# Patient Record
Sex: FEMALE | Race: WHITE | NOT HISPANIC OR LATINO | Employment: OTHER | URBAN - METROPOLITAN AREA
[De-identification: names, ages, dates, MRNs, and addresses within clinical notes are randomized per-mention and may not be internally consistent; named-entity substitution may affect disease eponyms.]

---

## 2023-03-21 PROBLEM — R10.9 ABDOMINAL PAIN: Status: ACTIVE | Noted: 2023-03-21

## 2023-03-21 PROBLEM — N89.8 VAGINAL ITCHING: Status: ACTIVE | Noted: 2023-03-21

## 2023-03-21 PROBLEM — K59.00 CONSTIPATION: Status: ACTIVE | Noted: 2023-03-21

## 2023-03-21 PROBLEM — N39.0 ACUTE LOWER UTI: Status: ACTIVE | Noted: 2023-03-21

## 2023-03-21 PROBLEM — M13.0 ARTHRITIS OF MULTIPLE SITES: Status: ACTIVE | Noted: 2023-03-21

## 2023-03-21 PROBLEM — I80.9 SUPERFICIAL PHLEBITIS: Status: ACTIVE | Noted: 2023-03-21

## 2023-03-21 PROBLEM — M79.662 PAIN IN LEFT SHIN: Status: ACTIVE | Noted: 2023-03-21

## 2023-03-21 PROBLEM — K57.90 DIVERTICULOSIS: Status: ACTIVE | Noted: 2023-03-21

## 2023-03-21 PROBLEM — C50.919 BREAST CANCER, FEMALE (MULTI): Status: ACTIVE | Noted: 2023-03-21

## 2023-03-21 PROBLEM — R41.3 MEMORY LOSS OF UNKNOWN CAUSE: Status: ACTIVE | Noted: 2023-03-21

## 2023-03-21 PROBLEM — G30.9 ALZHEIMER'S DEMENTIA (MULTI): Status: ACTIVE | Noted: 2023-03-21

## 2023-03-21 PROBLEM — I10 BENIGN ESSENTIAL HTN: Status: ACTIVE | Noted: 2023-03-21

## 2023-03-21 PROBLEM — F02.80 ALZHEIMER'S DEMENTIA (MULTI): Status: ACTIVE | Noted: 2023-03-21

## 2023-03-21 PROBLEM — I63.9 ISCHEMIC STROKE (MULTI): Status: ACTIVE | Noted: 2023-03-21

## 2023-03-21 PROBLEM — I72.9 ANEURYSM (CMS-HCC): Status: ACTIVE | Noted: 2023-03-21

## 2023-03-21 PROBLEM — E11.9 DIABETES MELLITUS, TYPE 2 (MULTI): Status: ACTIVE | Noted: 2023-03-21

## 2023-03-21 PROBLEM — K55.9 ISCHEMIC COLITIS (MULTI): Status: ACTIVE | Noted: 2023-03-21

## 2023-03-21 PROBLEM — S80.01XA CONTUSION OF KNEE, RIGHT: Status: ACTIVE | Noted: 2023-03-21

## 2023-03-21 PROBLEM — E78.5 HLD (HYPERLIPIDEMIA): Status: ACTIVE | Noted: 2023-03-21

## 2023-03-21 PROBLEM — F03.90 DEMENTIA, SENILE (MULTI): Status: ACTIVE | Noted: 2023-03-21

## 2023-03-21 PROBLEM — R53.82 CHRONIC FATIGUE: Status: ACTIVE | Noted: 2023-03-21

## 2023-03-21 RX ORDER — ACETAMINOPHEN 500 MG
TABLET ORAL
COMMUNITY
Start: 2016-06-06 | End: 2023-04-06 | Stop reason: ALTCHOICE

## 2023-03-21 RX ORDER — MELOXICAM 15 MG/1
1 TABLET ORAL DAILY
COMMUNITY
Start: 2022-03-24 | End: 2023-04-06 | Stop reason: ALTCHOICE

## 2023-03-21 RX ORDER — METFORMIN HYDROCHLORIDE 500 MG/1
TABLET ORAL 2 TIMES DAILY
COMMUNITY
End: 2023-04-06 | Stop reason: SDUPTHER

## 2023-03-21 RX ORDER — DONEPEZIL HYDROCHLORIDE 5 MG/1
TABLET, FILM COATED ORAL
COMMUNITY
End: 2023-04-06 | Stop reason: ALTCHOICE

## 2023-03-21 RX ORDER — DONEPEZIL HYDROCHLORIDE 10 MG/1
TABLET, FILM COATED ORAL
COMMUNITY
End: 2023-04-06 | Stop reason: ALTCHOICE

## 2023-03-21 RX ORDER — METFORMIN HYDROCHLORIDE 500 MG/1
TABLET, EXTENDED RELEASE ORAL
COMMUNITY
Start: 2015-08-03 | End: 2023-04-06 | Stop reason: SDUPTHER

## 2023-03-21 RX ORDER — VITAMIN E (DL,TOCOPHERYL ACET) 180 MG
CAPSULE ORAL
COMMUNITY
Start: 2016-06-06 | End: 2023-04-06 | Stop reason: ALTCHOICE

## 2023-03-21 RX ORDER — MULTIVIT-MIN/IRON/FOLIC ACID/K 18-600-40
CAPSULE ORAL
COMMUNITY
Start: 2016-06-06 | End: 2023-04-06 | Stop reason: ALTCHOICE

## 2023-04-06 ENCOUNTER — LAB (OUTPATIENT)
Dept: LAB | Facility: LAB | Age: 83
End: 2023-04-06
Payer: MEDICARE

## 2023-04-06 ENCOUNTER — OFFICE VISIT (OUTPATIENT)
Dept: PRIMARY CARE | Facility: CLINIC | Age: 83
End: 2023-04-06
Payer: MEDICARE

## 2023-04-06 ENCOUNTER — TELEPHONE (OUTPATIENT)
Dept: PRIMARY CARE | Facility: CLINIC | Age: 83
End: 2023-04-06

## 2023-04-06 VITALS
SYSTOLIC BLOOD PRESSURE: 140 MMHG | BODY MASS INDEX: 32.76 KG/M2 | OXYGEN SATURATION: 99 % | HEART RATE: 76 BPM | DIASTOLIC BLOOD PRESSURE: 62 MMHG | WEIGHT: 182 LBS

## 2023-04-06 DIAGNOSIS — Z13.29 SCREENING FOR THYROID DISORDER: ICD-10-CM

## 2023-04-06 DIAGNOSIS — Z00.00 ROUTINE GENERAL MEDICAL EXAMINATION AT A HEALTH CARE FACILITY: ICD-10-CM

## 2023-04-06 DIAGNOSIS — G25.81 RESTLESS LEG SYNDROME: ICD-10-CM

## 2023-04-06 DIAGNOSIS — Z13.0 SCREENING FOR DEFICIENCY ANEMIA: ICD-10-CM

## 2023-04-06 DIAGNOSIS — E55.9 VITAMIN D DEFICIENCY: ICD-10-CM

## 2023-04-06 DIAGNOSIS — E78.5 HYPERLIPIDEMIA, UNSPECIFIED HYPERLIPIDEMIA TYPE: ICD-10-CM

## 2023-04-06 DIAGNOSIS — I10 BENIGN ESSENTIAL HTN: ICD-10-CM

## 2023-04-06 DIAGNOSIS — Z00.00 ROUTINE GENERAL MEDICAL EXAMINATION AT A HEALTH CARE FACILITY: Primary | ICD-10-CM

## 2023-04-06 DIAGNOSIS — F02.80 ALZHEIMER'S DEMENTIA, UNSPECIFIED DEMENTIA SEVERITY, UNSPECIFIED TIMING OF DEMENTIA ONSET, UNSPECIFIED WHETHER BEHAVIORAL, PSYCHOTIC, OR MOOD DISTURBANCE OR ANXIETY (MULTI): ICD-10-CM

## 2023-04-06 DIAGNOSIS — R53.82 CHRONIC FATIGUE: ICD-10-CM

## 2023-04-06 DIAGNOSIS — F03.90 DEMENTIA, SENILE (MULTI): ICD-10-CM

## 2023-04-06 DIAGNOSIS — E11.69 TYPE 2 DIABETES MELLITUS WITH OTHER SPECIFIED COMPLICATION, UNSPECIFIED WHETHER LONG TERM INSULIN USE (MULTI): ICD-10-CM

## 2023-04-06 DIAGNOSIS — Z13.6 ENCOUNTER FOR SCREENING FOR CARDIOVASCULAR DISORDERS: ICD-10-CM

## 2023-04-06 DIAGNOSIS — G30.9 ALZHEIMER'S DEMENTIA, UNSPECIFIED DEMENTIA SEVERITY, UNSPECIFIED TIMING OF DEMENTIA ONSET, UNSPECIFIED WHETHER BEHAVIORAL, PSYCHOTIC, OR MOOD DISTURBANCE OR ANXIETY (MULTI): ICD-10-CM

## 2023-04-06 DIAGNOSIS — E11.69 TYPE 2 DIABETES MELLITUS WITH OTHER SPECIFIED COMPLICATION, UNSPECIFIED WHETHER LONG TERM INSULIN USE (MULTI): Primary | ICD-10-CM

## 2023-04-06 PROBLEM — E11.9 TYPE 2 DIABETES MELLITUS (MULTI): Status: ACTIVE | Noted: 2022-07-12

## 2023-04-06 PROBLEM — R41.3 MEMORY LOSS: Status: ACTIVE | Noted: 2022-07-12

## 2023-04-06 PROBLEM — C50.919 BREAST CANCER (MULTI): Status: ACTIVE | Noted: 2022-07-12

## 2023-04-06 LAB
APPEARANCE, URINE: ABNORMAL
BILIRUBIN, URINE: NEGATIVE
BLOOD, URINE: NEGATIVE
CHOLESTEROL (MG/DL) IN SER/PLAS: 264 MG/DL (ref 0–199)
CHOLESTEROL IN HDL (MG/DL) IN SER/PLAS: 35.9 MG/DL
CHOLESTEROL/HDL RATIO: 7.4
COLOR, URINE: YELLOW
ERYTHROCYTE DISTRIBUTION WIDTH (RATIO) BY AUTOMATED COUNT: 13.8 % (ref 11.5–14.5)
ERYTHROCYTE MEAN CORPUSCULAR HEMOGLOBIN CONCENTRATION (G/DL) BY AUTOMATED: 32.1 G/DL (ref 32–36)
ERYTHROCYTE MEAN CORPUSCULAR VOLUME (FL) BY AUTOMATED COUNT: 97 FL (ref 80–100)
ERYTHROCYTES (10*6/UL) IN BLOOD BY AUTOMATED COUNT: 4.64 X10E12/L (ref 4–5.2)
GLUCOSE, URINE: NEGATIVE MG/DL
HEMATOCRIT (%) IN BLOOD BY AUTOMATED COUNT: 44.8 % (ref 36–46)
HEMOGLOBIN (G/DL) IN BLOOD: 14.4 G/DL (ref 12–16)
KETONES, URINE: ABNORMAL MG/DL
LDL: 192 MG/DL (ref 0–99)
LEUKOCYTE ESTERASE, URINE: ABNORMAL
LEUKOCYTES (10*3/UL) IN BLOOD BY AUTOMATED COUNT: 8.5 X10E9/L (ref 4.4–11.3)
MUCUS, URINE: NORMAL /LPF
NITRITE, URINE: NEGATIVE
NRBC (PER 100 WBCS) BY AUTOMATED COUNT: 0 /100 WBC (ref 0–0)
PH, URINE: 6 (ref 5–8)
PLATELETS (10*3/UL) IN BLOOD AUTOMATED COUNT: 280 X10E9/L (ref 150–450)
PROTEIN, URINE: ABNORMAL MG/DL
RBC, URINE: 3 /HPF (ref 0–5)
SPECIFIC GRAVITY, URINE: 1.02 (ref 1–1.03)
SQUAMOUS EPITHELIAL CELLS, URINE: 6 /HPF
THYROTROPIN (MIU/L) IN SER/PLAS BY DETECTION LIMIT <= 0.05 MIU/L: 3.52 MIU/L (ref 0.44–3.98)
TRIGLYCERIDE (MG/DL) IN SER/PLAS: 180 MG/DL (ref 0–149)
UROBILINOGEN, URINE: <2 MG/DL (ref 0–1.9)
VLDL: 36 MG/DL (ref 0–40)
WBC, URINE: 1 /HPF (ref 0–5)

## 2023-04-06 PROCEDURE — 81001 URINALYSIS AUTO W/SCOPE: CPT

## 2023-04-06 PROCEDURE — 85027 COMPLETE CBC AUTOMATED: CPT

## 2023-04-06 PROCEDURE — G0439 PPPS, SUBSEQ VISIT: HCPCS | Performed by: STUDENT IN AN ORGANIZED HEALTH CARE EDUCATION/TRAINING PROGRAM

## 2023-04-06 PROCEDURE — 83036 HEMOGLOBIN GLYCOSYLATED A1C: CPT

## 2023-04-06 PROCEDURE — 82607 VITAMIN B-12: CPT

## 2023-04-06 PROCEDURE — 82306 VITAMIN D 25 HYDROXY: CPT

## 2023-04-06 PROCEDURE — 36415 COLL VENOUS BLD VENIPUNCTURE: CPT

## 2023-04-06 PROCEDURE — 84443 ASSAY THYROID STIM HORMONE: CPT

## 2023-04-06 PROCEDURE — 3078F DIAST BP <80 MM HG: CPT | Performed by: STUDENT IN AN ORGANIZED HEALTH CARE EDUCATION/TRAINING PROGRAM

## 2023-04-06 PROCEDURE — 3077F SYST BP >= 140 MM HG: CPT | Performed by: STUDENT IN AN ORGANIZED HEALTH CARE EDUCATION/TRAINING PROGRAM

## 2023-04-06 PROCEDURE — 80061 LIPID PANEL: CPT

## 2023-04-06 PROCEDURE — 1170F FXNL STATUS ASSESSED: CPT | Performed by: STUDENT IN AN ORGANIZED HEALTH CARE EDUCATION/TRAINING PROGRAM

## 2023-04-06 PROCEDURE — 1159F MED LIST DOCD IN RCRD: CPT | Performed by: STUDENT IN AN ORGANIZED HEALTH CARE EDUCATION/TRAINING PROGRAM

## 2023-04-06 RX ORDER — PREDNISOLONE ACETATE 10 MG/ML
SUSPENSION/ DROPS OPHTHALMIC
COMMUNITY
Start: 2022-07-16 | End: 2023-04-06 | Stop reason: ALTCHOICE

## 2023-04-06 RX ORDER — MELOXICAM 15 MG/1
15 TABLET ORAL
COMMUNITY
Start: 2022-04-06

## 2023-04-06 RX ORDER — BRIMONIDINE TARTRATE 2 MG/ML
SOLUTION/ DROPS OPHTHALMIC
COMMUNITY
Start: 2022-07-15 | End: 2023-04-06 | Stop reason: ALTCHOICE

## 2023-04-06 RX ORDER — ANASTROZOLE 1 MG/1
TABLET ORAL
COMMUNITY
Start: 2019-02-10 | End: 2023-04-06 | Stop reason: ALTCHOICE

## 2023-04-06 RX ORDER — LANCETS
EACH MISCELLANEOUS
Qty: 100 EACH | Refills: 3 | Status: SHIPPED | OUTPATIENT
Start: 2023-04-06

## 2023-04-06 RX ORDER — METFORMIN HYDROCHLORIDE 500 MG/1
500 TABLET ORAL
Qty: 180 TABLET | Refills: 3 | Status: SHIPPED | OUTPATIENT
Start: 2023-04-06 | End: 2024-04-22

## 2023-04-06 RX ORDER — PREDNISOLONE ACETATE 10 MG/ML
1 SUSPENSION/ DROPS OPHTHALMIC 3 TIMES DAILY
COMMUNITY
Start: 2022-07-15 | End: 2023-04-06 | Stop reason: ALTCHOICE

## 2023-04-06 RX ORDER — KETOROLAC TROMETHAMINE 5 MG/ML
1 SOLUTION OPHTHALMIC 4 TIMES DAILY
COMMUNITY
Start: 2022-04-18 | End: 2023-04-06 | Stop reason: ALTCHOICE

## 2023-04-06 RX ORDER — BRIMONIDINE TARTRATE 2 MG/ML
1 SOLUTION/ DROPS OPHTHALMIC 2 TIMES DAILY
COMMUNITY
Start: 2022-07-15 | End: 2023-04-06 | Stop reason: ALTCHOICE

## 2023-04-06 RX ORDER — BLOOD SUGAR DIAGNOSTIC
STRIP MISCELLANEOUS
Qty: 100 STRIP | Refills: 3 | Status: SHIPPED | OUTPATIENT
Start: 2023-04-06

## 2023-04-06 RX ORDER — METFORMIN HYDROCHLORIDE 500 MG/1
500 TABLET ORAL 2 TIMES DAILY
COMMUNITY
End: 2023-04-06 | Stop reason: SDUPTHER

## 2023-04-06 RX ORDER — METFORMIN HYDROCHLORIDE 500 MG/1
500 TABLET, EXTENDED RELEASE ORAL 2 TIMES DAILY
COMMUNITY
Start: 2018-12-15 | End: 2023-04-06 | Stop reason: ALTCHOICE

## 2023-04-06 RX ORDER — KETOROLAC TROMETHAMINE 5 MG/ML
SOLUTION OPHTHALMIC
COMMUNITY
Start: 2022-07-21 | End: 2023-04-06 | Stop reason: ALTCHOICE

## 2023-04-06 RX ORDER — PRAMIPEXOLE DIHYDROCHLORIDE 0.5 MG/1
0.5 TABLET ORAL NIGHTLY PRN
Qty: 30 TABLET | Refills: 11 | Status: SHIPPED | OUTPATIENT
Start: 2023-04-06 | End: 2024-04-05

## 2023-04-06 ASSESSMENT — ACTIVITIES OF DAILY LIVING (ADL)
DRESSING: INDEPENDENT
DOING_HOUSEWORK: TOTAL CARE
BATHING: INDEPENDENT
GROCERY_SHOPPING: TOTAL CARE
MANAGING_FINANCES: TOTAL CARE
TAKING_MEDICATION: TOTAL CARE

## 2023-04-06 ASSESSMENT — ENCOUNTER SYMPTOMS
LOSS OF SENSATION IN FEET: 0
OCCASIONAL FEELINGS OF UNSTEADINESS: 1
DEPRESSION: 0

## 2023-04-06 ASSESSMENT — PATIENT HEALTH QUESTIONNAIRE - PHQ9
1. LITTLE INTEREST OR PLEASURE IN DOING THINGS: NOT AT ALL
SUM OF ALL RESPONSES TO PHQ9 QUESTIONS 1 AND 2: 0
2. FEELING DOWN, DEPRESSED OR HOPELESS: NOT AT ALL

## 2023-04-06 NOTE — PROGRESS NOTES
Subjective   Patient ID: Hailey Rosas is a 82 y.o. female who presents for Medicare Annual Wellness Visit Subsequent (She is not fasting.) and Med Refill.    HPI Medicare wellness visit.    Review of Systems  Constitutional: NO F, chills, or sweats  Eyes: no blurred vision or visual disturbance  ENT: no hearing loss, no congestion, no nasal discharge, no hoarseness and no sore throat.   Cardiovascular: no chest pain, no edema, no palps and no syncope.   Respiratory: no cough,no s.o.b. and no wheezing  Gastrointestinal: no abdominal pain, No C/D no N/V, no blood in stools  Genitourinary: no dysuria, no change in urinary frequency, no urinary hesitancy and no feelings of urinary urgency.   Musculoskeletal: Multiple joint pains  Integumentary: no new skin lesions and no rashes.   Neurological: no difficulty walking, no headache, progressive memory issues, seems to have restless legs at night  Psychiatric: no anxiety, no depression, no anhedonia and no substance use disorders.   Endocrine: no recent weight gain and no recent weight loss.   Hematologic/Lymphatic: no tendency for easy bruising and no swollen glands.  Objective   /62 (BP Location: Left arm, Patient Position: Sitting, BP Cuff Size: Adult)   Pulse 76   Wt 82.6 kg (182 lb)   SpO2 99%   BMI 32.76 kg/m²     Physical Exam  gen- a & o x 1, pleasantly demented  heent- eomi, perrla, ear canals patent, TM's non-erythematous, no fluid, frontal and maxillary sinus's nontender  neck- supple, nontender, no palpable or enlarged nodes, no thyromegaly  heart- rrr, no murmurs  lungs- cta b/l , no w/r/r  chest- symmetric, nontender  ab- soft, nontender, no palpable organomegaly, postive bowel sounds  ex's- no c/c/e  neuro- CNs 2-12 grossly intact, full sensation and strength in all extremities    Assessment/Plan     1. Medicare wellness visit.  No concerns on exam.  Screening labs ordered today.  No longer needs mammograms or Pap smears or  colonoscopies.  Vaccinations are up-to-date.     2. Chronic Alzheimer's dementia.  Do your best with regular exercise.  Try to participate in social engagement.  Reading, puzzles games etc.  We do advise on following up with neurology.     3. Generalized osteoarthritis. Bilateral hands. Occasional hips, ankles and knees.

## 2023-04-06 NOTE — TELEPHONE ENCOUNTER
[facility] left a VM stating you sent the prescription for test strips and lancets but did not send a prescription for the machine.

## 2023-04-06 NOTE — PATIENT INSTRUCTIONS
1. Medicare wellness visit.  No concerns on exam.  Screening labs ordered today.  No longer needs mammograms or Pap smears or colonoscopies.  Vaccinations are up-to-date.     2. Chronic Alzheimer's dementia.  Do your best with regular exercise.  Try to participate in social engagement.  Reading, puzzles games etc.  We do advise on following up with neurology.     3. Generalized osteoarthritis. Bilateral hands. Occasional hips, ankles and knees.

## 2023-04-07 LAB
CALCIDIOL (25 OH VITAMIN D3) (NG/ML) IN SER/PLAS: 28 NG/ML
COBALAMIN (VITAMIN B12) (PG/ML) IN SER/PLAS: 321 PG/ML (ref 211–911)
ESTIMATED AVERAGE GLUCOSE FOR HBA1C: 163 MG/DL
HEMOGLOBIN A1C/HEMOGLOBIN TOTAL IN BLOOD: 7.3 %

## 2023-04-08 RX ORDER — DEXTROSE 4 G
TABLET,CHEWABLE ORAL
Qty: 1 EACH | Refills: 0 | Status: SHIPPED | OUTPATIENT
Start: 2023-04-08

## 2023-05-02 ENCOUNTER — TELEPHONE (OUTPATIENT)
Dept: PRIMARY CARE | Facility: CLINIC | Age: 83
End: 2023-05-02
Payer: MEDICARE

## 2023-10-06 ENCOUNTER — OFFICE VISIT (OUTPATIENT)
Dept: PRIMARY CARE | Facility: CLINIC | Age: 83
End: 2023-10-06
Payer: MEDICARE

## 2023-10-06 VITALS
WEIGHT: 172 LBS | DIASTOLIC BLOOD PRESSURE: 62 MMHG | HEART RATE: 70 BPM | SYSTOLIC BLOOD PRESSURE: 110 MMHG | OXYGEN SATURATION: 98 % | BODY MASS INDEX: 30.96 KG/M2

## 2023-10-06 DIAGNOSIS — E11.69 TYPE 2 DIABETES MELLITUS WITH OTHER SPECIFIED COMPLICATION, UNSPECIFIED WHETHER LONG TERM INSULIN USE (MULTI): Primary | ICD-10-CM

## 2023-10-06 LAB — HBA1C MFR BLD: 6.4 % (ref 4.2–6.5)

## 2023-10-06 PROCEDURE — 3078F DIAST BP <80 MM HG: CPT | Performed by: STUDENT IN AN ORGANIZED HEALTH CARE EDUCATION/TRAINING PROGRAM

## 2023-10-06 PROCEDURE — 83036 HEMOGLOBIN GLYCOSYLATED A1C: CPT | Mod: CLIA WAIVED TEST | Performed by: STUDENT IN AN ORGANIZED HEALTH CARE EDUCATION/TRAINING PROGRAM

## 2023-10-06 PROCEDURE — 99213 OFFICE O/P EST LOW 20 MIN: CPT | Performed by: STUDENT IN AN ORGANIZED HEALTH CARE EDUCATION/TRAINING PROGRAM

## 2023-10-06 PROCEDURE — 1125F AMNT PAIN NOTED PAIN PRSNT: CPT | Performed by: STUDENT IN AN ORGANIZED HEALTH CARE EDUCATION/TRAINING PROGRAM

## 2023-10-06 PROCEDURE — 1036F TOBACCO NON-USER: CPT | Performed by: STUDENT IN AN ORGANIZED HEALTH CARE EDUCATION/TRAINING PROGRAM

## 2023-10-06 PROCEDURE — 1159F MED LIST DOCD IN RCRD: CPT | Performed by: STUDENT IN AN ORGANIZED HEALTH CARE EDUCATION/TRAINING PROGRAM

## 2023-10-06 PROCEDURE — 3074F SYST BP LT 130 MM HG: CPT | Performed by: STUDENT IN AN ORGANIZED HEALTH CARE EDUCATION/TRAINING PROGRAM

## 2023-10-06 RX ORDER — ACETAMINOPHEN 500 MG
50 TABLET ORAL DAILY
COMMUNITY
Start: 2016-06-06

## 2023-10-06 RX ORDER — ANASTROZOLE 1 MG/1
1 TABLET ORAL DAILY
COMMUNITY

## 2023-10-06 RX ORDER — VITAMIN E (DL,TOCOPHERYL ACET) 180 MG
1 CAPSULE ORAL DAILY
COMMUNITY
Start: 2016-06-06

## 2023-10-06 RX ORDER — MULTIVIT-MIN/IRON/FOLIC ACID/K 18-600-40
1 CAPSULE ORAL DAILY
COMMUNITY
Start: 2016-06-06

## 2023-10-06 RX ORDER — DONEPEZIL HYDROCHLORIDE 5 MG/1
1 TABLET, FILM COATED ORAL NIGHTLY
COMMUNITY
Start: 2021-08-20

## 2023-10-06 RX ORDER — ASPIRIN 81 MG/1
81 TABLET ORAL DAILY
COMMUNITY

## 2023-10-06 ASSESSMENT — ENCOUNTER SYMPTOMS: DEPRESSION: 1

## 2023-10-06 ASSESSMENT — PAIN SCALES - GENERAL: PAINLEVEL: 7

## 2023-10-06 NOTE — PATIENT INSTRUCTIONS
1.  Diabetes follow-up.  Hemoglobin A1c is excellent at 6.4.  No change to meds.  Diabetes can be progressive (requiring more medications with time). If we can't regulate your Hemoglobin A1C with current meds we will add more in the future and consider Insulin therapy. Persistently elevated blood sugars can damage small blood vessels and nerves in the brain, eyes, heart, kidneys and feet. You should be getting yearly dilated eye exams. You should be inspecting your feet daily for any injuries. Also do not walk bare foot indoors or outside. Weight loss (to a BMI 30) and regular exercise (30 minutes per day) can greatly improve your need for medications for your diabetes. A hemoglobin A1c (3 month blood sugar average) will be checked 1-4 times per year to help monitor you blood sugar control.We are aggressive at treating diabetes because it is a significant risk factor for heart disease and stroke. Please reduce the amount of carbohydrates you are eating, and instead try eating more lean protein such as fish, turkey, and chicken. Please try to follow an ADA (American Diabetes Association) diet.    Follow-up in 6 months for Medicare wellness visit sooner for any issues or concerns.  Call for any refills needed.

## 2023-10-06 NOTE — PROGRESS NOTES
Subjective   Patient ID: Hailey Rosas is a 83 y.o. female who presents for Diabetes (6 month follow up.) and Memory Loss.    [facility] [place] comes in for 6-month diabetes check    Review of Systems  Constitutional: NO F, chills, or sweats  Eyes: no blurred vision or visual disturbance  ENT: no hearing loss, no congestion, no nasal discharge, no hoarseness and no sore throat.   Cardiovascular: no chest pain, no edema, no palps and no syncope.   Respiratory: no cough,no s.o.b. and no wheezing  Gastrointestinal: no abdominal pain, No C/D no N/V, no blood in stools  Genitourinary: no dysuria, no change in urinary frequency, no urinary hesitancy and no feelings of urinary urgency.   Musculoskeletal: no arthralgias,  no back pain and no myalgias.   Integumentary: no new skin lesions and no rashes.   Neurological: no difficulty walking, no headache, no limb weakness, no numbness and no tingling.,  Significant dementia  Psychiatric: no anxiety, no depression, no anhedonia and no substance use disorders.   Endocrine: Stable readings at home  Objective   /62 (BP Location: Left arm, Patient Position: Sitting, BP Cuff Size: Adult)   Pulse 70   Wt 78 kg (172 lb)   SpO2 98%   BMI 30.96 kg/m²     Physical Exam  gen- a & o x 3, nad, pleasant  heent- eomi, perrla, ear canals patent, TM's non-erythematous, no fluid, frontal and maxillary sinus's nontender  neck- supple, nontender, no palpable or enlarged nodes, no thyromegaly  heart- rrr, no murmurs  lungs- cta b/l , no w/r/r    Assessment/Plan     1.  Diabetes follow-up.  Hemoglobin A1c is excellent at 6.4.  No change to meds.  Diabetes can be progressive (requiring more medications with time). If we can't regulate your Hemoglobin A1C with current meds we will add more in the future and consider Insulin therapy. Persistently elevated blood sugars can damage small blood vessels and nerves in the brain, eyes, heart, kidneys and feet. You should be getting yearly dilated eye exams. You  should be inspecting your feet daily for any injuries. Also do not walk bare foot indoors or outside. Weight loss (to a BMI 30) and regular exercise (30 minutes per day) can greatly improve your need for medications for your diabetes. A hemoglobin A1c (3 month blood sugar average) will be checked 1-4 times per year to help monitor you blood sugar control.We are aggressive at treating diabetes because it is a significant risk factor for heart disease and stroke. Please reduce the amount of carbohydrates you are eating, and instead try eating more lean protein such as fish, turkey, and chicken. Please try to follow an ADA (American Diabetes Association) diet.    Follow-up in 6 months for Medicare wellness visit sooner for any issues or concerns.  Call for any refills needed.

## 2023-12-26 ENCOUNTER — TELEPHONE (OUTPATIENT)
Dept: PRIMARY CARE | Facility: CLINIC | Age: 83
End: 2023-12-26
Payer: MEDICARE

## 2023-12-26 NOTE — TELEPHONE ENCOUNTER
Patient received a Covid Vaccine (Moderna) back in march of this year , and wants to know if they can get another one this week. Pharmacist advised against it   [phone]

## 2024-02-26 ENCOUNTER — TELEPHONE (OUTPATIENT)
Dept: PRIMARY CARE | Facility: CLINIC | Age: 84
End: 2024-02-26
Payer: MEDICARE

## 2024-02-26 NOTE — TELEPHONE ENCOUNTER
Pt  called and states that the pt needs a referral for a stroke doctor. Pt  would like to know if you could recommend and place that order.

## 2024-03-26 ENCOUNTER — APPOINTMENT (OUTPATIENT)
Dept: NEUROLOGY | Facility: HOSPITAL | Age: 84
End: 2024-03-26
Payer: MEDICARE

## 2024-04-09 PROBLEM — B96.89 ACUTE BACTERIAL SINUSITIS: Status: ACTIVE | Noted: 2024-04-09

## 2024-04-09 PROBLEM — Z85.3 HISTORY OF MALIGNANT NEOPLASM OF BREAST: Status: ACTIVE | Noted: 2024-04-09

## 2024-04-09 PROBLEM — Z86.39 HISTORY OF TYPE 1 DIABETES MELLITUS: Status: ACTIVE | Noted: 2024-04-09

## 2024-04-09 PROBLEM — J01.90 ACUTE BACTERIAL SINUSITIS: Status: ACTIVE | Noted: 2024-04-09

## 2024-04-09 PROBLEM — M79.605 PAIN OF LEFT LOWER EXTREMITY: Status: ACTIVE | Noted: 2023-03-21

## 2024-04-09 PROBLEM — G25.81 RESTLESS LEGS SYNDROME: Status: ACTIVE | Noted: 2024-04-09

## 2024-04-12 ENCOUNTER — APPOINTMENT (OUTPATIENT)
Dept: NEUROLOGY | Facility: HOSPITAL | Age: 84
End: 2024-04-12
Payer: MEDICARE

## 2024-04-21 DIAGNOSIS — E11.69 TYPE 2 DIABETES MELLITUS WITH OTHER SPECIFIED COMPLICATION, UNSPECIFIED WHETHER LONG TERM INSULIN USE (MULTI): ICD-10-CM

## 2024-04-22 RX ORDER — METFORMIN HYDROCHLORIDE 500 MG/1
500 TABLET ORAL
Qty: 180 TABLET | Refills: 3 | Status: SHIPPED | OUTPATIENT
Start: 2024-04-22 | End: 2024-04-23 | Stop reason: SDUPTHER

## 2024-04-23 ENCOUNTER — TELEPHONE (OUTPATIENT)
Dept: PRIMARY CARE | Facility: CLINIC | Age: 84
End: 2024-04-23

## 2024-04-23 DIAGNOSIS — E11.69 TYPE 2 DIABETES MELLITUS WITH OTHER SPECIFIED COMPLICATION, UNSPECIFIED WHETHER LONG TERM INSULIN USE (MULTI): ICD-10-CM

## 2024-04-23 RX ORDER — METFORMIN HYDROCHLORIDE 500 MG/1
500 TABLET ORAL
Qty: 180 TABLET | Refills: 3 | Status: SHIPPED | OUTPATIENT
Start: 2024-04-23

## 2024-08-30 ENCOUNTER — OFFICE VISIT (OUTPATIENT)
Dept: NEUROLOGY | Facility: HOSPITAL | Age: 84
End: 2024-08-30
Payer: MEDICARE

## 2024-08-30 VITALS
WEIGHT: 159.83 LBS | HEIGHT: 62 IN | TEMPERATURE: 98 F | SYSTOLIC BLOOD PRESSURE: 150 MMHG | DIASTOLIC BLOOD PRESSURE: 71 MMHG | HEART RATE: 75 BPM | BODY MASS INDEX: 29.41 KG/M2 | RESPIRATION RATE: 16 BRPM

## 2024-08-30 DIAGNOSIS — G30.9 ALZHEIMER'S DEMENTIA, UNSPECIFIED DEMENTIA SEVERITY, UNSPECIFIED TIMING OF DEMENTIA ONSET, UNSPECIFIED WHETHER BEHAVIORAL, PSYCHOTIC, OR MOOD DISTURBANCE OR ANXIETY (MULTI): Primary | ICD-10-CM

## 2024-08-30 DIAGNOSIS — F02.80 ALZHEIMER'S DEMENTIA, UNSPECIFIED DEMENTIA SEVERITY, UNSPECIFIED TIMING OF DEMENTIA ONSET, UNSPECIFIED WHETHER BEHAVIORAL, PSYCHOTIC, OR MOOD DISTURBANCE OR ANXIETY (MULTI): Primary | ICD-10-CM

## 2024-08-30 PROCEDURE — 3078F DIAST BP <80 MM HG: CPT | Performed by: PSYCHIATRY & NEUROLOGY

## 2024-08-30 PROCEDURE — 3077F SYST BP >= 140 MM HG: CPT | Performed by: PSYCHIATRY & NEUROLOGY

## 2024-08-30 PROCEDURE — 1126F AMNT PAIN NOTED NONE PRSNT: CPT | Performed by: PSYCHIATRY & NEUROLOGY

## 2024-08-30 PROCEDURE — 99214 OFFICE O/P EST MOD 30 MIN: CPT | Performed by: PSYCHIATRY & NEUROLOGY

## 2024-08-30 PROCEDURE — 1159F MED LIST DOCD IN RCRD: CPT | Performed by: PSYCHIATRY & NEUROLOGY

## 2024-08-30 RX ORDER — DONEPEZIL HYDROCHLORIDE 5 MG/1
5 TABLET, FILM COATED ORAL NIGHTLY
Qty: 90 TABLET | Refills: 3 | Status: SHIPPED | OUTPATIENT
Start: 2024-08-30 | End: 2025-08-30

## 2024-08-30 ASSESSMENT — PAIN SCALES - GENERAL: PAINLEVEL: 0-NO PAIN

## 2024-08-30 ASSESSMENT — MINI MENTAL STATE EXAM
WHAT STATE, COUNTRY, CITY, HOSPITAL, FLOOR: 4 CORRECT
WHAT IS THE YEAR, SEASON, DATE, DAY, AND MONTH: 0 CORRECT

## 2024-08-30 NOTE — PROGRESS NOTES
"Subjective     Hailey Rosas is a right handed  84 y.o. year old female who presents for follow up of moderate dementia. Patient is accompanied by: child daughter  Visit type: follow up visit, Last seen  09/26/2023    Interval Hx   Daughter has been staying with patient and  since  broke hip (2 months now). She is planning to be there until at least second week of October. Son lives close by and can come help sometimes.    Appetite is good as long as it is something she is craving like doughnuts. Dentures are damaged so limiting some of the eating but planning to fix soon. Report no new issue with bowel or bladder. Mood is good except with some instances of frustration/confusion when she cannot remember certain things. Daughter was not aware that patient needed to be on Aricept so patient has not been getting it. Hallucinations have not happened in a while. Per daughter, the day she had hallucinations, she had had some alcohol that (at least 2 drinks of bourbon). No hallucination without the alcohol. Sleep is not great because she is often up in the middle of the night. Denies any fall but states that if it happened in the night then she might not remember. However denies feeling any unexplained aches/pains.    Prior Hx:  HPI:  Ms. Rosas is a 83 -year-old RH woman with 14 years of formal education and PMHx of who is presenting today for follow up of moderate dementia, seen 2 months prior and started on Donepezil 5mg daily at last visit. Patient is oriented to person, but not time at all oriented to doctors office not building does not know why she's here.     Mood is \"good.\". has not noticed any significant depressive symptoms. Gets frustrated with memory problems.   No excessive worry or anxiety.   Has good appetite - eats healthy.      No change in personality, social disinhibition, change in dietary preferences, loss of empathy, stereotyped or repetitive behaviors.      No visual hallucinations, " fluctuating cognition, tremors, REM sleep behavior disorder, falls. Does not comprehend what she sees- doesn't know what to do when meals are presented to her. Does not know how to use utensils.      Neuropsychiatric symptoms:   Depression - denies depression. Appetite ok. Sleeping fine. No worthlessness/helplessness. No suicidal thoughts, intent or plans.   Anxiety - Denies.   Psychosis - Denies.   Jessica - Denies.   Suicidality - Denies.      Previously referred for cognitive evaluation. She is accompanied by her  Emmanuel, he has healthcare POA. She was seen by Dr. Jiménez and Dr. Graves in the past, Dr. Jiménez diagnosed her with dementia ans started her on donepezil, Dr. Graves raised it to 10 mg and the patient ran out of it, she doesn't take it for over a year, she tolerated the medication well.   Emmanuel has been noticing cognitive changes for over 3 years. Onset was gradual. has trouble with spelling, names of things, forgets scheduled events but uses a calendar. Forgets conversations. progressive loss of memory for over the last few years with loss of activities of daily living including inability to cook, drive and more recently some difficulty with dressing/bathing.      MRI brain wo contrast on 9/27/2021 shows no diffusion restriction to suggest acute infarct. Generalized parenchymal volume loss, similar to previous, chronic white matter changes likely due to chronic small vessel ischemic disease  Areas of encephalomalacia in the right frontal parietal lobes likely represent chronic infarcts. No evidence of intracranial hemorrhage. There is no mass effect or midline shift.     Past Neuropsychiatric History:  Handedness: right.   History of traumatic brain injury: None.   History of seizures: None  History of stroke: None.   Past psychiatric history: None     PMH/PSH:  As above, in addition     Meds: reviewed as listed     Allergies: reviewed as listed        Family history:   Psychiatric: None.  "  Dementia: None   Parkinsons disease: None  Huntingtons disease: None  ALS: None     Social History:   No Tobacco use, 4-5 glasses alcohol weekly, no recreational drugs  . 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  Current Outpatient Medications   Medication Instructions    anastrozole (ARIMIDEX) 1 mg, oral, Daily    ascorbic acid, vitamin C, 500 mg capsule 1 capsule, oral, Daily    ascorbic acid-vitamin E-biotin (Hair, Skin, Nails with Biotin) 7.5-7.5-1,250 mg-unit-mcg tablet,chewable 1 tablet, oral, Daily    aspirin 81 mg, oral, Daily    blood sugar diagnostic (GenStrip Test Strip) strip Test once daily as directed    blood-glucose meter misc Test glucose as directed    cholecalciferol (VITAMIN D3) 50 mcg, oral, Daily    donepezil (ARICEPT) 5 mg, oral, Nightly    lancets misc Test once daily as directed    MECOBALAMIN, VITAMIN B12, ORAL oral    meloxicam (MOBIC) 15 mg, oral, Daily RT    metFORMIN (GLUCOPHAGE) 500 mg, oral, 2 times daily (morning and late afternoon)    pramipexole (MIRAPEX) 0.5 mg, oral, Nightly PRN    ubidecarenone (COENZYME Q10, BULK, MISC) 1 capsule, oral, Daily      Vitals:    08/30/24 1607   BP: 150/71   BP Location: Left arm   Patient Position: Sitting   BP Cuff Size: Adult   Pulse: 75   Resp: 16   Temp: 36.7 °C (98 °F)   Weight: 72.5 kg (159 lb 13.3 oz)   Height: 1.575 m (5' 2\")     Objective   Neurological Exam  Mental Status  Awake and alert. Level of consciousness: Not oriented to season, date, or any current events in society. Aware she took elevator for her appointment, aware she is in a medical building and that she is in [place]..    Motor    Mild rest tremor in LUE (tremor also present in posture)  Moderate winter in finger tapping L>R  No persistent clapping  Has paratonia, no clear increased tone..    Gait    Ambulates with cane  Gait is cautious " with mild left shoulder elevation and reduced left arm swing.      TR HGBA1C (Data Conversion)   Date Value Ref Range Status   05/24/2018 7.7 (H) 4.2 - 6.5 % Final     POCT Hemoglobin A1C   Date Value Ref Range Status   10/06/2023 6.4 4.2 - 6.5 % Final     Estimated Average Glucose   Date Value Ref Range Status   04/06/2023 163 MG/DL Final     TSH   Date Value Ref Range Status   04/06/2023 3.52 0.44 - 3.98 mIU/L Final     Comment:      TSH testing is performed using different testing    methodology at [facility] than at other    [facility]. Direct result comparisons should    only be made within the same method.     Folate   Date Value Ref Range Status   09/27/2021 16.3 >5.0 ng/mL Final     Comment:     Low           <3.4  Borderline 3.4-5.0  Normal        >5.0  .   Biotin interference may cause falsely elevated results.    Patients taking a Biotin dose of up to 5 mg/day should    refrain from taking Biotin for 24 hours before sample    collection. Providers may contact their local laboratory   for further information.         Assessment/Plan     Hailey Rosas is a 84 y.o. RH female who presents with daughter for follow up of moderate dementia. Last seen  09/26/2023. Patient's  has been immobile for past 2 months since fracturing his hip. Daughter has been living with them to help out. Patient has not been getting Aricept as daughter was not aware that she was supposed to be on it. Daughter reports cognition has declined since last visit. Denies VH, any changes in bowel or bladder or falls. Sleep is not great as she wakes up frequently in the middle of the night but appetite and mood are good. Family is working and coordinating continued supervision.    The following was discussed:  - [place] at next visit as patient did not have reading glasses today.  - Please take the medication Aricept (donepezil) every day. This is the medication that can slow the progression of your dementia. Refill  was ordered. In the future we can consider adding a second medication called Memantine   - We recommend you increase your social interactions. One way to do this effectively is to join a neighborhood community center so as to meet and interact with more people. We also encourage other mind stimulating activities such as reading, doing puzzles or sodoku.   - Please bring your glasses next time so we can have you complete he memory test.  - Please make sure you address the hearing issues as it can play a significant role in how your brain receives information   - Please call and schedule a follow up appointment in 6 months     General brain health guidelines:  - make sure your other medical conditions are well controlled (e.g., high blood pressure, high cholesterol, diabetes, sleep apnea etc)  - do not smoke or chew tobacco  - address any sensory deficits (e.g., proper glasses for poor eyesight, hearing aids for hearing loss)  - use a weekly pill box  - eat a heart healthy diet (e.g., lots of fruits and vegetables, low fat and cholesterol)  - exercise at least four days per week, 30 minutes at a time at an intensity that would make it difficult to converse with someone   - make sure you are getting at least 7 hours of quality sleep per night  - keep yourself mentally active daily by reading, playing cards, doing word searches, puzzles, etc.  - stay socially active by being part of a group or organization     Here are more resources available for you :    a. Consultation with a  who specializes in cognitive decline in older adulthood. They can assist with counseling, educational resources, patient support groups, caregiver support groups, and preparation for future changes. Beth Wachter or Basia Pappas at [facility] ([address] [place] [address] [address] in [place]; [phone]) could help with a referral, or they can contact the Alzheimer's Association 24-hour Helpline (1-548.139.4114).   b.  Keely Spivey at [facility] runs a caregiver training program that uses empirically based techniques to prepare and equip caregivers for the demands of that role when caring for someone with dementia.    c. Helpful resources for addressing the day-to-day challenges of cognitive dysfunction are offered at the Alzheimer's Association (now the Alzheimer's Disease and Related Disorders Association) website (www.alz.org) or by calling their 24-hour Helpline (1-775.312.4825).    d. The Family Caregiver [employer] website also has helpful information: http://www.caregiver.org/caregiver/jsp/home.jsp

## 2025-04-10 ENCOUNTER — APPOINTMENT (OUTPATIENT)
Dept: PRIMARY CARE | Facility: CLINIC | Age: 85
End: 2025-04-10
Payer: MEDICARE

## 2025-04-10 VITALS
WEIGHT: 157 LBS | SYSTOLIC BLOOD PRESSURE: 118 MMHG | HEIGHT: 62 IN | BODY MASS INDEX: 28.89 KG/M2 | DIASTOLIC BLOOD PRESSURE: 58 MMHG

## 2025-04-10 DIAGNOSIS — E11.69 TYPE 2 DIABETES MELLITUS WITH OTHER SPECIFIED COMPLICATION, UNSPECIFIED WHETHER LONG TERM INSULIN USE (MULTI): Primary | ICD-10-CM

## 2025-04-10 DIAGNOSIS — H61.23 BILATERAL IMPACTED CERUMEN: ICD-10-CM

## 2025-04-10 DIAGNOSIS — G25.81 RESTLESS LEG SYNDROME: ICD-10-CM

## 2025-04-10 DIAGNOSIS — F03.90 DEMENTIA, UNSPECIFIED DEMENTIA SEVERITY, UNSPECIFIED DEMENTIA TYPE, UNSPECIFIED WHETHER BEHAVIORAL, PSYCHOTIC, OR MOOD DISTURBANCE OR ANXIETY (MULTI): ICD-10-CM

## 2025-04-10 PROCEDURE — 99204 OFFICE O/P NEW MOD 45 MIN: CPT | Performed by: STUDENT IN AN ORGANIZED HEALTH CARE EDUCATION/TRAINING PROGRAM

## 2025-04-10 PROCEDURE — 3078F DIAST BP <80 MM HG: CPT | Performed by: STUDENT IN AN ORGANIZED HEALTH CARE EDUCATION/TRAINING PROGRAM

## 2025-04-10 PROCEDURE — G2211 COMPLEX E/M VISIT ADD ON: HCPCS | Performed by: STUDENT IN AN ORGANIZED HEALTH CARE EDUCATION/TRAINING PROGRAM

## 2025-04-10 PROCEDURE — 1036F TOBACCO NON-USER: CPT | Performed by: STUDENT IN AN ORGANIZED HEALTH CARE EDUCATION/TRAINING PROGRAM

## 2025-04-10 PROCEDURE — 3074F SYST BP LT 130 MM HG: CPT | Performed by: STUDENT IN AN ORGANIZED HEALTH CARE EDUCATION/TRAINING PROGRAM

## 2025-04-10 NOTE — PROGRESS NOTES
"Subjective   Patient ID: Hailey Rosas is a 84 y.o. female who presents for [facility] (Bilateral ear issues x 4 months).    HPI   Initial PCP visit.    Pt is a 84yoF w/ a hx T2DM and alzheimer's dementia presenting today to [facility].     Here with daughter Cathy.    Does walks almost daily, does crosswords/look at photos  Listens to music and certain tv shows    Mood is pretty good. Has a couple hours of lucidity daily usually and waxes/wanes overall.    Has issues with ear fullness and feels like she can't hear. Has rhinorrhea daily and will take sudafed. Denies any vision loss, tinnitus, or sinus pressure.    Meds:   Metformin 500mg BID  ASA 81  donepezil    Allergies   Allergen Reactions    Hay Fever And Allergy Relief Unknown     hayfever    House Dust Unknown    Melon Unknown    Statins-Hmg-Coa Reductase Inhibitors Other and Unknown    Penicillins Other and Rash     weakness    Sulfa (Sulfonamide Antibiotics) Other, Rash and Unknown     weakness       SocHx:   was primary caregiver but now daughter lives with her and helps take care of her.  broke hip last summer and since then son/daughter rotate helping her. She is reliant for iADLs/ADLs. Ambulates with cane.         Review of Systems  12 point ROS reviewed and otherwise negative except as stated above.    Objective   /58   Ht 1.575 m (5' 2\")   Wt 71.2 kg (157 lb)   BMI 28.72 kg/m²     Physical Exam  Constitutional: Well-developed female in no acute distress.  HEENT: NCAT. EOMI. Cerumen impaction.   Respiratory: CTAB. No wheezes, crackles, or rhonchi. Normal respiratory effort on RA.  Cardiovascular: RRR, normal S1/S2, No murmurs, rubs, or gallops.   Neuro: CN II-XII grossly intact. Moving all extremities with no focal deficits. Intermittently confused.   MSK: WWP, no peripheral edema  Skin: No lesions, wounds, or bruising  Psych: Appropriate mood and affect.      Assessment/Plan     #Dementia/Hx of CVA: follows with " neurology. Continue with donepezil, ASA 81mg daily. Lipid panel ordered.     #Cerumen impaction: Rx sent for debrox. ENT referral placed    #DM: A1c 6.4 in 2023, repeat ordered. Continue metformin 500mg BID. Sees optometrist at [facility].    #Health maintenance: No further screening indicated. Advised age-appropriate vaccines. Longitudinal care. Routine labs ordered.    RTC in 1 year or sooner if needed.    Ruthann Estrella MD  PGY-2 Internal Medicine    Trainee role: Resident    I saw and evaluated the patient. I personally obtained the key and critical portions of the history and physical exam or was physically present for key and critical portions performed by the trainee. I reviewed the trainee's documentation and discussed the patient with the trainee. I agree with the trainee's medical decision making as documented on the trainee's notes.    James Davis M.D.

## 2025-04-11 LAB
ALBUMIN SERPL-MCNC: 4.3 G/DL (ref 3.6–5.1)
ALP SERPL-CCNC: 63 U/L (ref 37–153)
ALT SERPL-CCNC: 7 U/L (ref 6–29)
ANION GAP SERPL CALCULATED.4IONS-SCNC: 7 MMOL/L (CALC) (ref 7–17)
AST SERPL-CCNC: 14 U/L (ref 10–35)
BILIRUB SERPL-MCNC: 0.4 MG/DL (ref 0.2–1.2)
BUN SERPL-MCNC: 18 MG/DL (ref 7–25)
CALCIUM SERPL-MCNC: 10 MG/DL (ref 8.6–10.4)
CHLORIDE SERPL-SCNC: 103 MMOL/L (ref 98–110)
CHOLEST SERPL-MCNC: 227 MG/DL
CHOLEST/HDLC SERPL: 6 (CALC)
CO2 SERPL-SCNC: 29 MMOL/L (ref 20–32)
CREAT SERPL-MCNC: 0.84 MG/DL (ref 0.6–0.95)
EGFRCR SERPLBLD CKD-EPI 2021: 68 ML/MIN/1.73M2
ERYTHROCYTE [DISTWIDTH] IN BLOOD BY AUTOMATED COUNT: 13 % (ref 11–15)
EST. AVERAGE GLUCOSE BLD GHB EST-MCNC: 131 MG/DL
EST. AVERAGE GLUCOSE BLD GHB EST-SCNC: 7.3 MMOL/L
GLUCOSE SERPL-MCNC: 109 MG/DL (ref 65–139)
HBA1C MFR BLD: 6.2 % OF TOTAL HGB
HCT VFR BLD AUTO: 41.7 % (ref 35–45)
HDLC SERPL-MCNC: 38 MG/DL
HGB BLD-MCNC: 13.7 G/DL (ref 11.7–15.5)
LDLC SERPL CALC-MCNC: 148 MG/DL (CALC)
MCH RBC QN AUTO: 30.6 PG (ref 27–33)
MCHC RBC AUTO-ENTMCNC: 32.9 G/DL (ref 32–36)
MCV RBC AUTO: 93.1 FL (ref 80–100)
NONHDLC SERPL-MCNC: 189 MG/DL (CALC)
PLATELET # BLD AUTO: 300 THOUSAND/UL (ref 140–400)
PMV BLD REES-ECKER: 9.8 FL (ref 7.5–12.5)
POTASSIUM SERPL-SCNC: 4.6 MMOL/L (ref 3.5–5.3)
PROT SERPL-MCNC: 6.5 G/DL (ref 6.1–8.1)
RBC # BLD AUTO: 4.48 MILLION/UL (ref 3.8–5.1)
SODIUM SERPL-SCNC: 139 MMOL/L (ref 135–146)
TRIGL SERPL-MCNC: 264 MG/DL
WBC # BLD AUTO: 8.8 THOUSAND/UL (ref 3.8–10.8)

## 2025-05-12 ENCOUNTER — APPOINTMENT (OUTPATIENT)
Dept: OTOLARYNGOLOGY | Facility: CLINIC | Age: 85
End: 2025-05-12
Payer: MEDICARE

## 2025-05-22 ENCOUNTER — OFFICE VISIT (OUTPATIENT)
Dept: NEUROLOGY | Facility: HOSPITAL | Age: 85
End: 2025-05-22
Payer: MEDICARE

## 2025-05-22 VITALS
HEIGHT: 59 IN | RESPIRATION RATE: 14 BRPM | BODY MASS INDEX: 31.71 KG/M2 | SYSTOLIC BLOOD PRESSURE: 141 MMHG | DIASTOLIC BLOOD PRESSURE: 64 MMHG | HEART RATE: 60 BPM

## 2025-05-22 DIAGNOSIS — F01.50 MIXED ALZHEIMER AND VASCULAR DEMENTIA: Primary | ICD-10-CM

## 2025-05-22 DIAGNOSIS — F02.80 ALZHEIMER'S DEMENTIA, UNSPECIFIED DEMENTIA SEVERITY, UNSPECIFIED TIMING OF DEMENTIA ONSET, UNSPECIFIED WHETHER BEHAVIORAL, PSYCHOTIC, OR MOOD DISTURBANCE OR ANXIETY (MULTI): ICD-10-CM

## 2025-05-22 DIAGNOSIS — G30.9 MIXED ALZHEIMER AND VASCULAR DEMENTIA: Primary | ICD-10-CM

## 2025-05-22 DIAGNOSIS — G20.C PARKINSONISM, UNSPECIFIED PARKINSONISM TYPE (MULTI): ICD-10-CM

## 2025-05-22 DIAGNOSIS — F02.80 MIXED ALZHEIMER AND VASCULAR DEMENTIA: Primary | ICD-10-CM

## 2025-05-22 DIAGNOSIS — G30.9 ALZHEIMER'S DEMENTIA, UNSPECIFIED DEMENTIA SEVERITY, UNSPECIFIED TIMING OF DEMENTIA ONSET, UNSPECIFIED WHETHER BEHAVIORAL, PSYCHOTIC, OR MOOD DISTURBANCE OR ANXIETY (MULTI): ICD-10-CM

## 2025-05-22 PROCEDURE — 1159F MED LIST DOCD IN RCRD: CPT | Performed by: PSYCHIATRY & NEUROLOGY

## 2025-05-22 PROCEDURE — 99214 OFFICE O/P EST MOD 30 MIN: CPT | Mod: GC | Performed by: PSYCHIATRY & NEUROLOGY

## 2025-05-22 PROCEDURE — 3078F DIAST BP <80 MM HG: CPT | Performed by: PSYCHIATRY & NEUROLOGY

## 2025-05-22 PROCEDURE — 1126F AMNT PAIN NOTED NONE PRSNT: CPT | Performed by: PSYCHIATRY & NEUROLOGY

## 2025-05-22 PROCEDURE — G2211 COMPLEX E/M VISIT ADD ON: HCPCS | Performed by: PSYCHIATRY & NEUROLOGY

## 2025-05-22 PROCEDURE — 99214 OFFICE O/P EST MOD 30 MIN: CPT | Performed by: PSYCHIATRY & NEUROLOGY

## 2025-05-22 PROCEDURE — 3077F SYST BP >= 140 MM HG: CPT | Performed by: PSYCHIATRY & NEUROLOGY

## 2025-05-22 RX ORDER — MEMANTINE HYDROCHLORIDE 10 MG/1
10 TABLET ORAL 2 TIMES DAILY
Qty: 60 TABLET | Refills: 2 | Status: SHIPPED | OUTPATIENT
Start: 2025-06-22 | End: 2026-06-22

## 2025-05-22 RX ORDER — DONEPEZIL HYDROCHLORIDE 5 MG/1
5 TABLET, FILM COATED ORAL
Qty: 90 TABLET | Refills: 3 | Status: SHIPPED | OUTPATIENT
Start: 2025-05-22 | End: 2026-05-22

## 2025-05-22 RX ORDER — MEMANTINE HYDROCHLORIDE 5 MG/1
TABLET ORAL
Qty: 68 TABLET | Refills: 0 | Status: SHIPPED | OUTPATIENT
Start: 2025-05-22

## 2025-05-22 ASSESSMENT — MONTREAL COGNITIVE ASSESSMENT (MOCA)
8. SERIAL SUBTRACTION OF 7S: 0
VISUOSPATIAL/EXECUTIVE SUBSCORE: 0
7. [VIGILENCE] TAP WHEN HEARING DESIGNATED LETTER: 1
6. READ LIST OF DIGITS [FORWARD/BACKWARD]: 2
10. [FLUENCY] NAME WORDS STARTING WITH DESIGNATED LETTER: 0
12. MEMORY INDEX SCORE: 0
WHAT LEVEL OF EDUCATION WAS ATTAINED: 1
9. REPEAT EACH SENTENCE: 0
13. ORIENTATION SUBSCORE: 0
11. FOR EACH PAIR OF WORDS, WHAT CATEGORY DO THEY BELONG TO (OUT OF 2): 0
WHAT IS THE TOTAL SCORE (OUT OF 30): 4
5. MEMORY TRIALS: 0
4. NAME EACH OF THE THREE ANIMALS SHOWN: 0

## 2025-05-22 ASSESSMENT — PAIN SCALES - GENERAL: PAINLEVEL_OUTOF10: 0-NO PAIN

## 2025-05-22 NOTE — PATIENT INSTRUCTIONS
-Change donepezil to the morning to see if this helps with vivid dreams. Take 5 mg with breakfast  -One week after changing donepezil, start memantine  (for memory):   - To start memantine:  Week one: Take one tablet (5 mg) in the evening  Week two: Take one tablet (5 mg) in the morning and one tablet (5 mg) in the evening  Week three: Take one tablet (5 mg) in the morning and two tablets (10 mg) in the evening  Week four: Take two tablets (10 mg) in the morning and two tablets (10 mg) in the evening  For your next prescription, we will change to 10 mg tablets so that you only have to take one pill twice daily     -For sleep, you can start melatonin 3 mg at bedtime for two weeks. If this is not helpful, you can increase to 6 mg at bedtime.   -Agree with getting hearing checked!   -Recommend safety features to prevent leaving the house at night. This can be a latch for the door, or an alarm that goes off if the door is opened at night.  -Recommend identification bracelet  -We will place a referral to stroke neurology  -Referral placed for Occupational Therapy and Physical Therapy  -Follow up with your PCP for blood sugar and cholesterol   -Follow up in 4 months or sooner if needed    https://www.alz.org/ has a lot of resources.     Savvy Caregiver Program    The Savvy Caregiver Program, an evidence-based, scientifically validated psycho-educational program, is a free service to caregivers of dementia patients. This program is a six-week course of two-hour classes, designed to provide the caregiver with the tools they need to understand and manage the memory loss and behavioral symptoms common in dementia.    For more information or referrals, contact Keely Spivey RN, at [phone] or email [email or web address].

## 2025-05-22 NOTE — PROGRESS NOTES
Subjective     Hailey Rosas is a 84 y.o. year old female    HPI    Hailey Rosas is a 84 y.o. RH female who presents with daughter for follow up of moderate dementia.   She was last seen 8/30/24 at which time it was recommended to increase social interactions, bring glasses for next visit, address hearing issues, and resources were provided.     Per Hailey she is doing overall well. She is frustrated with her memory changes which she has recognized for many years. She walks often with her kids. She denies pain. She denies depression or anxiety. She is hard of hearing.  Appetite is good, she eats well.    Daughter Cathy provides collateral:  Sleep is variable. There are some nights where she gets up and wanders.   Daughter Cathy stays the night and she is there ~20 nights per week. Hailey's son caretakes when Cathy is not there. Hailey lives with her  though he had a bad injury   She has very vivid dreams. Takes donepezil at night.   Has not seen stroke neurology.    Prior workup:  MRI brain 2021 showed generalized parenchymal volume loss with moderate white matter disease likely 2/2 small vessel ischemic changes and several remote infarcts    Review of Systems  See HPI    Problem List[1]  Medical History[2]  Surgical History[3]  Social History     Tobacco Use    Smoking status: Never    Smokeless tobacco: Never   Substance Use Topics    Alcohol use: Yes     family history includes Breast cancer in an other family member; Diabetes in an other family member; Rectal cancer in an other family member; Stroke in an other family member; Thyroid disease in an other family member; blood clots in an other family member.  Current Medications[4]  Allergies[5]    Objective   Neurological Exam  Physical Exam  Mental Status Examination:  General appearance: Well-groomed, good eye contact, cooperative  Orientation: Alert and oriented to person, place, and time  Motor: some slowing  Speech: regular rate, rhythm, tone, and volume.  "Hard of hearing  Mood: \"pretty good mostly\"  Affect: stable, full range, with brightening, and mood congruent  Passive death wish: denies  Suicidal ideation: denies  Thought process: logical, linear, and goal-directed without loosening of associations  Thought content: no hallucinations, delusions, and not responding to internal stimuli  Insight/Judgment: good/fair  Fund of knowledge: limited  Recent and remote memory: limited  Attention span and concentration: limited  Language: normal receptive and expressive language without paraphrasic errors. logopenic    MoCA  Visuospatial/Executive: 0  Namin  Memory (Score '0' as this is an Unscored Section): 0  Attention: Read List of Digits: 2  Attention: Read List of Letters: 1  Attention: Serial Sevens: 0  Language: Repeat: 0  Language: Fluency: 0  Abstraction: 0  Delayed Recall: 0  Orientation: 0  Add 1 Point if </=12 yr Education: 1  MOCA Total Score: 4        NEUROLOGICAL EXAMINATION      Cranial nerves:  CN II: visual fields full to confrontation  CN III, IV, VI: Pupils round, reactive to light and accommodation.  Lids symmetric.  No ptosis.  Extra-occular muscles are intact with normal alignment.  No nystagmus  CN V: Facial sensation intact bilaterally.    CN VII: Normal and symmetric facial strength.  Nasolabial folds symmetric  CN VIII: Hearing impaired bilaterally   CN IX: Palate elevates symmetrically.  CN XI: Normal shoulder shrug and neck turning  CN XII: Tongue midline, with normal bulk and strength, no fasciculations        Motor:  Muscle bulk was normal in all extremities.  5/5 strength in all extremities  Bradykinetic finger taps bilaterally, L> R  Bradykinetic foot taps bilaterally, L>R  Jaw tremor and RUE tremor at rest with activation  Normal tone    Reflexes:   Right UE     LEFT UE  BR: 2          BR: 2  Biceps: 2    Biceps: 2  Triceps: 2   Triceps: 2    RIGHT LE     LEFT LE  Knee: 2        Knee: 2  Ankle: 2       Ankle: 2    Negative Teo's " reflex  No frontal release signs    Coordination:  Normal finger to nose testing and rapid alternating movements    Gait:  Able to stand from seated position with arms across chest  Gait slow, ambulates with walker  Arm swing fair    Sensory:  Negative Romberg's sign      Assessment/Plan   Hailey Rosas is a 84 y.o. RH female with PMHx HTN, HLD, diabetes, osteoarthritis, and dementia who presents with daughter for follow up of moderate dementia. She has had significant multidomain cognitive changes for at least 5 years. MoCA today is 4/30. She is hard of hearing which is due to cerumen impaction that they have an appointment for next week. Presentation is consistent with moderate dementia, likely combined due to Alzheimer's disease and vascular disease. Lipids and Hba1c are elevated so recommend referral to stroke neurology and to follow up with PCP for optimization of these. She has poor sleep due to impaired sleep maintenance so recommend changing donepezil to am and starting melatonin. Will also start memantine for cognitive enhancement. Risks, benefits, and alternatives were discussed. Safety recommendations were discussed and resources were provided. Parkinsonism (predominant L sided bradykinesia, some rest tremor of UE and jaw with activation) was witnessed on exam today but to limit pharmacologic changes, will defer on trialing sinemet until next visit which was discussed.    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 Take 5 mg with breakfast  -One week after changing donepezil, start memantine titration   -For sleep, you can start melatonin 3 mg at bedtime for two weeks. If this is not helpful, you can increase to 6 mg at bedtime.   -Agree with getting hearing checked  -Recommend safety features to prevent leaving the house at night. This can be a latch for the door,  or an alarm that goes off if the door is opened at night. Recommend identification bracelet  -Resources provided for [employer] and Savvy Caregiver  -Referral to stroke neurology  -Follow up with PCP for blood sugar, blood pressure and cholesterol   -Will consider sinemet for parkinsonism at next visit  -Followup in 4 months or sooner if needed    Nancy Muñoz MD  PGY-5 Behavioral Neurology and Neuropsychiatry  05/22/25 6:12 PM           [1]   Patient Active Problem List  Diagnosis    Abdominal pain    Acute lower urinary tract infection    Alzheimer's dementia (Multi)    Senile dementia (Multi)    Aneurysm    Osteoarthritis    Benign essential hypertension    Malignant neoplasm of female breast    Fatigue    Constipation    Contusion of knee, right    Type 2 diabetes mellitus    Diverticular disease    Hyperlipidemia    Ischemic colitis (Multi)    Ischemic cerebrovascular accident (CVA) (Multi)    Amnesia    Pain of left lower extremity    Phlebitis    Pruritus of vagina    Acute bronchitis    Elevated blood pressure reading without diagnosis of hypertension    Carpal tunnel syndrome    Contusion of chest wall    Contusion of knee    Dizziness    Neck pain    Shoulder pain    Vitamin D deficiency    Acute bacterial sinusitis    History of malignant neoplasm of breast    History of type 1 diabetes mellitus    Restless legs syndrome   [2]   Past Medical History:  Diagnosis Date    Personal history of malignant neoplasm of breast 01/09/2017    History of malignant neoplasm of breast    Personal history of other diseases of the musculoskeletal system and connective tissue     History of arthritis    Personal history of other diseases of the respiratory system 01/09/2020    History of acute bacterial sinusitis    Personal history of other endocrine, nutritional and metabolic disease     History of type 1 diabetes mellitus    Personal history of pneumonia (recurrent)     History of pneumonia   [3]    Past Surgical History:  Procedure Laterality Date    KNEE SURGERY  09/23/2016    Knee Surgery    MASTECTOMY, PARTIAL  01/09/2017    Right Breast Partial Mastectomy    MR HEAD ANGIO WO IV CONTRAST  11/24/2021    MR HEAD ANGIO WO IV CONTRAST 11/24/2021 PAR ANCILLARY LEGACY    MR NECK ANGIO WO IV CONTRAST  11/24/2021    MR NECK ANGIO WO IV CONTRAST 11/24/2021 PAR ANCILLARY LEGACY    OTHER SURGICAL HISTORY  01/09/2017    Left Breast Partial Mastectomy    OTHER SURGICAL HISTORY  01/09/2017    Axillary Lymphadenectomy    SENTINEL LYMPH NODE BIOPSY  01/09/2017    [place] Lymph Node Biopsy   [4]   Current Outpatient Medications:     ascorbic acid-vitamin E-biotin (Hair, Skin, Nails with Biotin) 7.5-7.5-1,250 mg-unit-mcg tablet,chewable, Chew 1 tablet once daily., Disp: , Rfl:     aspirin 81 mg EC tablet, Take 1 tablet (81 mg) by mouth once daily., Disp: , Rfl:     donepezil (Aricept) 5 mg tablet, Take 1 tablet (5 mg) by mouth once daily at bedtime., Disp: 90 tablet, Rfl: 3    metFORMIN (Glucophage) 500 mg tablet, Take 1 tablet (500 mg) by mouth 2 times a day with meals., Disp: 180 tablet, Rfl: 3    ubidecarenone (COENZYME Q10, BULK, MISC), Take 1 capsule by mouth once daily., Disp: , Rfl:   [5]   Allergies  Allergen Reactions    Hay Fever And Allergy Relief Unknown     hayfever    House Dust Unknown    Melon Unknown    Statins-Hmg-Coa Reductase Inhibitors Other and Unknown    Penicillins Other and Rash     weakness    Sulfa (Sulfonamide Antibiotics) Other, Rash and Unknown     weakness

## 2025-05-29 ENCOUNTER — OFFICE VISIT (OUTPATIENT)
Dept: OTOLARYNGOLOGY | Facility: CLINIC | Age: 85
End: 2025-05-29
Payer: MEDICARE

## 2025-05-29 VITALS
SYSTOLIC BLOOD PRESSURE: 131 MMHG | HEIGHT: 59 IN | HEART RATE: 64 BPM | WEIGHT: 157 LBS | DIASTOLIC BLOOD PRESSURE: 77 MMHG | BODY MASS INDEX: 31.65 KG/M2 | TEMPERATURE: 98.6 F

## 2025-05-29 DIAGNOSIS — H93.8X3 SENSATION OF PLUGGED EAR ON BOTH SIDES: Primary | ICD-10-CM

## 2025-05-29 DIAGNOSIS — H61.23 BILATERAL IMPACTED CERUMEN: ICD-10-CM

## 2025-05-29 PROCEDURE — 3075F SYST BP GE 130 - 139MM HG: CPT | Performed by: NURSE PRACTITIONER

## 2025-05-29 PROCEDURE — 99203 OFFICE O/P NEW LOW 30 MIN: CPT | Performed by: NURSE PRACTITIONER

## 2025-05-29 PROCEDURE — 3078F DIAST BP <80 MM HG: CPT | Performed by: NURSE PRACTITIONER

## 2025-05-29 PROCEDURE — 69210 REMOVE IMPACTED EAR WAX UNI: CPT | Performed by: NURSE PRACTITIONER

## 2025-05-29 PROCEDURE — 1126F AMNT PAIN NOTED NONE PRSNT: CPT | Performed by: NURSE PRACTITIONER

## 2025-05-29 PROCEDURE — 1159F MED LIST DOCD IN RCRD: CPT | Performed by: NURSE PRACTITIONER

## 2025-05-29 PROCEDURE — 1036F TOBACCO NON-USER: CPT | Performed by: NURSE PRACTITIONER

## 2025-05-29 PROCEDURE — 69210 REMOVE IMPACTED EAR WAX UNI: CPT | Mod: 50 | Performed by: NURSE PRACTITIONER

## 2025-05-29 PROCEDURE — 99213 OFFICE O/P EST LOW 20 MIN: CPT | Mod: 25 | Performed by: NURSE PRACTITIONER

## 2025-05-29 SDOH — ECONOMIC STABILITY: FOOD INSECURITY: WITHIN THE PAST 12 MONTHS, YOU WORRIED THAT YOUR FOOD WOULD RUN OUT BEFORE YOU GOT MONEY TO BUY MORE.: NEVER TRUE

## 2025-05-29 SDOH — ECONOMIC STABILITY: FOOD INSECURITY: WITHIN THE PAST 12 MONTHS, THE FOOD YOU BOUGHT JUST DIDN'T LAST AND YOU DIDN'T HAVE MONEY TO GET MORE.: NEVER TRUE

## 2025-05-29 ASSESSMENT — PATIENT HEALTH QUESTIONNAIRE - PHQ9
SUM OF ALL RESPONSES TO PHQ9 QUESTIONS 1 AND 2: 0
2. FEELING DOWN, DEPRESSED OR HOPELESS: NOT AT ALL
1. LITTLE INTEREST OR PLEASURE IN DOING THINGS: NOT AT ALL

## 2025-05-29 ASSESSMENT — ENCOUNTER SYMPTOMS
OCCASIONAL FEELINGS OF UNSTEADINESS: 1
LOSS OF SENSATION IN FEET: 0
DEPRESSION: 0

## 2025-05-29 ASSESSMENT — LIFESTYLE VARIABLES
HOW OFTEN DO YOU HAVE SIX OR MORE DRINKS ON ONE OCCASION: NEVER
HOW OFTEN DO YOU HAVE A DRINK CONTAINING ALCOHOL: MONTHLY OR LESS
SKIP TO QUESTIONS 9-10: 1
AUDIT-C TOTAL SCORE: 1
HOW MANY STANDARD DRINKS CONTAINING ALCOHOL DO YOU HAVE ON A TYPICAL DAY: 1 OR 2

## 2025-05-29 ASSESSMENT — COLUMBIA-SUICIDE SEVERITY RATING SCALE - C-SSRS
6. HAVE YOU EVER DONE ANYTHING, STARTED TO DO ANYTHING, OR PREPARED TO DO ANYTHING TO END YOUR LIFE?: NO
1. IN THE PAST MONTH, HAVE YOU WISHED YOU WERE DEAD OR WISHED YOU COULD GO TO SLEEP AND NOT WAKE UP?: NO
2. HAVE YOU ACTUALLY HAD ANY THOUGHTS OF KILLING YOURSELF?: NO

## 2025-05-29 ASSESSMENT — PAIN SCALES - GENERAL: PAINLEVEL_OUTOF10: 0-NO PAIN

## 2025-05-29 NOTE — PROGRESS NOTES
Subjective   Patient ID: Hailey Rosas is a 85 y.o. female who presents for Cerumen Impaction.    HPI  Patient here for ear cleaning. She is accompanied by her daughter. She is having trouble hearing. PCP noted wax. She has pretty good hearing baseline. No ear pain.   No previous ear surgery.    I reviewed patient's past medical and surgical history.  Problem List[1]  Surgical History[2]    Review of Systems    All other systems have been reviewed and are negative for complaints except for those mentioned in history of present illness, past medical history and problem list.    Objective   Physical Exam    Constitutional: No fever, chills, weight loss or weight gain  General appearance: Appears well, well-nourished, well groomed. No acute distress.    Communication: Normal communication    Psychiatric: Oriented to person, place and time. Normal mood and affect.    Neurologic: Cranial nerves II-XII grossly intact and symmetric bilaterally.    Head and Face:  Head: Atraumatic with no masses, lesions or scarring.  Face: Normal symmetry. No scars or deformities.  TMJ: Normal, no trismus.    Eyes: Conjunctiva not edematous or erythematous.     Right Ear: External inspection of ear with no deformity, scars, or masses. EAC is impacted with cerumen, TM not visible.     Left Ear: External inspection of ear with no deformity, scars, or masses. EAC is impacted with cerumen, TM not visible.     Nose: External inspection of nose: No nasal lesions, lacerations or scars. Anterior rhinoscopy with limited visualization past the inferior turbinates. No tenderness on frontal or maxillary sinus palpation.    Oral Cavity/Mouth: Oral cavity and oropharynx mucosa moist and pink. No lesions or masses. Tonsils appear normal. Uvula is midline. Tongue with no masses or lesions. Tongue with good mobility. The oropharynx is clear.    Neck: Normal appearing, symmetric, trachea midline.     Cardiovascular: Examination of peripheral vascular  system shows no clubbing or cyanosis.    Respiratory: No respiratory distress increased work of breathing. Inspection of the chest with symmetric chest expansion and normal respiratory effort.    Skin: No head and neck rashes.    Lymph nodes: No adenopathy.    Procedure: Cerumen Removal  Indication: Cerumen Impaction  Risks, benefits, alternatives, and expectations discussed with patient and patient wishes to proceed.    Bilateral canals with cerumen impaction.  Using the microscope and suction, large amounts of soft brown cerumen removed bilaterally. Both TMs intact. No effusions or retractions noted.  Patient tolerated procedure well.     Assessment/Plan   Diagnoses and all orders for this visit:  Sensation of plugged ear on both sides  Bilateral impacted cerumen    Ears were successfully cleaned. Patient notes improvement after the cleaning. Follow up in 6 months.    All questions answered to patient satisfaction.        LEONEL Boston-CNP 05/29/25 2:46 PM        [1]   Patient Active Problem List  Diagnosis    Abdominal pain    Acute lower urinary tract infection    Alzheimer's dementia (Multi)    Senile dementia (Multi)    Aneurysm    Osteoarthritis    Benign essential hypertension    Malignant neoplasm of female breast    Fatigue    Constipation    Contusion of knee, right    Type 2 diabetes mellitus    Diverticular disease    Hyperlipidemia    Ischemic colitis (Multi)    Ischemic cerebrovascular accident (CVA) (Multi)    Amnesia    Pain of left lower extremity    Phlebitis    Pruritus of vagina    Acute bronchitis    Elevated blood pressure reading without diagnosis of hypertension    Carpal tunnel syndrome    Contusion of chest wall    Contusion of knee    Dizziness    Neck pain    Shoulder pain    Vitamin D deficiency    Acute bacterial sinusitis    History of malignant neoplasm of breast    History of type 1 diabetes mellitus    Restless legs syndrome   [2]   Past Surgical History:  Procedure  Laterality Date    KNEE SURGERY  09/23/2016    Knee Surgery    MASTECTOMY, PARTIAL  01/09/2017    Right Breast Partial Mastectomy    MR HEAD ANGIO WO IV CONTRAST  11/24/2021    MR HEAD ANGIO WO IV CONTRAST 11/24/2021 PAR ANCILLARY LEGACY    MR NECK ANGIO WO IV CONTRAST  11/24/2021    MR NECK ANGIO WO IV CONTRAST 11/24/2021 PAR ANCILLARY LEGACY    OTHER SURGICAL HISTORY  01/09/2017    Left Breast Partial Mastectomy    OTHER SURGICAL HISTORY  01/09/2017    Axillary Lymphadenectomy    SENTINEL LYMPH NODE BIOPSY  01/09/2017    [place] Lymph Node Biopsy

## 2025-06-06 ENCOUNTER — APPOINTMENT (OUTPATIENT)
Dept: NEUROLOGY | Facility: HOSPITAL | Age: 85
End: 2025-06-06
Payer: MEDICARE

## 2025-06-27 DIAGNOSIS — G30.9 ALZHEIMER'S DEMENTIA, UNSPECIFIED DEMENTIA SEVERITY, UNSPECIFIED TIMING OF DEMENTIA ONSET, UNSPECIFIED WHETHER BEHAVIORAL, PSYCHOTIC, OR MOOD DISTURBANCE OR ANXIETY (MULTI): Primary | ICD-10-CM

## 2025-06-27 DIAGNOSIS — F02.80 ALZHEIMER'S DEMENTIA, UNSPECIFIED DEMENTIA SEVERITY, UNSPECIFIED TIMING OF DEMENTIA ONSET, UNSPECIFIED WHETHER BEHAVIORAL, PSYCHOTIC, OR MOOD DISTURBANCE OR ANXIETY (MULTI): Primary | ICD-10-CM

## 2025-06-27 RX ORDER — MEMANTINE HYDROCHLORIDE 5 MG/1
TABLET ORAL
Qty: 42 TABLET | Refills: 0 | Status: SHIPPED | OUTPATIENT
Start: 2025-06-27

## 2025-06-29 NOTE — PROGRESS NOTES
Neurological Physical Therapy Evaluation & Treatment      Patient Name: Hailey Rosas  MRN: [id number]  Today's Date: 6/30/2025  Time Calculation  Start Time: 1504  Stop Time: 1545  Time Calculation (min): 41 min    Insurance - reviewed   Visit number: 1 (updated 06/30/25)   Payor: [employer] / Plan: [employer] MEDICARE ASSURE / Product Type: *No Product type* /    No auth needed; $15 co/pay     Referring Provider: Nancy Muñoz,Zaki   Surgery: No surgery found, No surgery found.  Days since surgery: No surgery found       Assessment:      Hailey Rosas  is a 85 y.o. old patient who participated in a physical therapy evaluation today due to vascular dementia diagnosis. Hailey presents with signs and symptoms consistent with decreased balance and functional mobility. Hailey's impairments include: balance deficits, gait deficits, postural deficits, decreased strength, and decreased functional mobility.   Due to these impairments, she has the following functional limitations and participation restrictions:  increased fall risk, difficulty with ambulation, difficulty performing recreational activities, difficulty with completing/performing some ADLs/IADLs, and increased time to complete ADLs/IADLs. Hailey's clinical presentation is Evolving with changing characteristics with the level of complexity being moderate. Hailey's potential for rehab is good.  Skilled physical therapy services are appropriate and beneficial in order to achieve measurable and meaningful change in the objective tests and measures. Utilization of skilled physical therapy services will aid in advancing her functional status and attaining her therapy-related goals. Hailey verbalized understanding and is in agreement with all goals and plan of care.  Hailey left session with all questions answered and no increase in symptoms.      Plan:  OP PT Plan  Treatment/Interventions: Education/ Instruction, Gait training, Manual therapy, Neuromuscular  re-education, Self care/ home management, Therapeutic exercises, Therapeutic activities, Taping techniques  PT Plan: Skilled PT  PT Frequency: 1 time per week  Duration: 6 weeks  Onset Date: 05/22/25  Rehab Potential: Good  Plan of Care Agreement: Patient  NV: Start on balance HEP; dynamic balance exercises     Current Problem:   Problem List Items Addressed This Visit           ICD-10-CM    Mixed Alzheimer and vascular dementia - Primary G30.9, F01.50, F02.80    Relevant Orders    Follow Up In Physical Therapy         Subjective    General:    Hailey Rosas  is a 85 y.o. female  presenting to the clinic with a diagnosis of vascular dementia - dtr is present for the entire session. Both are not entirely sure why the patient is here for PT - said neurology wanted a consult. - when PT probes able to discern a more balance and gait issue. Dtr states that in the morning demonstrates slowness of movement, walks with a cane - without a cane demonstrates a limp. Dtr reports someone is home with her 24/7.     Hailey Rosas  denies:  numbness, tingling, diplopia, drop attacks, dysarthria, dysphagia, dizziness, saddle anesthesia, bowel changes, bladder changes, unexpected weight loss within the past 4 weeks, and unexpected weight gain within the past 4 weeks    Hailey Rosas  confirms: Raynauds     Prior Treatment/diagnostic images:    Medical History Form: Reviewed (scanned into chart)    Living Environment: single level house; [place] home - 2-3 down to the landing; 1 out the door; no needs for basement; tub/shower with shower chair and hand      Occupation: Retired      Prior Level of Function: dtr helps with showers; getting dressed IND     Exercise: irregularly    Functional Limitations: Cooking, cleaning, laundry, driving - family completes     Pt goals for therapy:  No goals - unsure why she is here     Precautions:  Fall Risk: High   Denies: Pacemaker, Hypertension, latex allergy, epilepsy/seizures, other known  cardiac problems, other known neurologic problem, other known pulmonary problems, unexpected weight gain or loss, saddle anesthesia, night sweats, night pain, diplopia, and drop attacks  Past Surgical history: BL Knee Replacement, Lumpectomy (L); Early 90s  Past Medical history: Cancer and Diabetes; Breast CA, Mini Strokes     Pain:  0/10    Objective   Posture: fwd head, rounded shoulders, increased thoracic kyphosis   Gait: The patient is ambulating with the following device: she is not using a device. and Ambulates with a cane. Gait deviations include: Lacks heel strike, Decreased velocity, Decreased stride length, Wide base of support, Downward gaze, and Forward flexed.  Sit to Stand Transfers: independent     Manual Muscle Test Hip: .  Hip Flexion R/L: 4- / 4-  Hip Abd R/L: 4 +/ 4+  Manual Muscle Test Bilateral Knees: .  Knee Flexion R/L: 4 +/ 5  Knee Extension R/L: 5 / 5  Manual Muscle Test Bilateral Ankles: .  Dorsiflexion R/L: 5 / 5  Plantarflexion R/L: 5 / 5    Outcome Measures:  Other Measures  Activities - Specific Balance Confidence Scale: 55.6 (55.6%)     Outcome Measures  Evaluation: 6/30/25   Mini Best   The Minibest is a shortened version of the Balance Evaluation Systems Test (BESTest), a clinical balance assessment tool that aims to target and identify 6 different balance control systems so that specific rehabilitation approaches can be designed for different balance deficits.  < or = 18/28 indicates an elevated risk for falling.     Hailey Rosas scored 11 indicating she is at an increased fall risk.       Treatments:    Therapeutic Activity: 15 minutes  Functional Performance via gait analysis of TUG: Verbal cues for sequencing/positioning. 2x10' at SBA.   Functional mobility via AROM of LE; Verbal cues for sequencing/positioning.  Functional Performance via MMT of LE: Hip, knee, ankle; Verbal cues for sequencing/positioning.  Functional Performance via Mini Best - vc/tc for proper sequencing  and positioning  Educated pt on POC, goals of physical therapy, purpose of physical therapy, as well as the benefits in participating in physical therapy to increase functional mobility and maximize pt safety.       EDUCATION:  Outpatient Education  Individual(s) Educated: Patient, Caregiver  Education Provided: Anatomy, Body Mechanics, Fall Risk, POC, Posture  Risk and Benefits Discussed with Patient/Caregiver/Other: yes  Patient/Caregiver Demonstrated Understanding: yes  Plan of Care Discussed and Agreed Upon: yes  Patient Response to Education: Patient/Caregiver Verbalized Understanding of Information, Patient/Caregiver Performed Return Demonstration of Exercises/Activities, Patient/Caregiver Asked Appropriate Questions  -Educated Hailey  on the role of PT and PT POC  -Educated Hailey regarding benefit and purpose of skilled PT services along with results of examination findings and how this correlates to their chief complaint.   -Answered Hailey's questions in full.  -Educated Hailey on relevant anatomy using model/essential anatomy 5 amanda and the rationale for exercises  -Hailey Rosas advised to hold any ex if it increases pain, Hailey Rosas verbalized understanding    Goals:    STG's (within 2 weeks)  Hailey Rosas will decrease pain by >/= 2/10 points from baseline to improve ability to perform household/recreational activities.    Hailey Rosas will be able to sleep through the night with less than 2 interruptions due to pain in order to improve mental and physical well-being in order to safely participate in ADLs.     Hailey Rosas will demonstrate independence,  excellent understanding of and report compliance with at least 3 exercises in her HEP to facilitate the learning process to maximize PT benefits and to facilitate independent rehab program upon discharge.    LTG's (by discharge)    Julio improve ABC score by at least 13% (MDC for PD H&Y1-4) or to at least 69% (<69% is cut off score  predicative of recurrent falls in PD population) to improve balance confidence and decrease fall risk.  Baseline 6/30/2025: 55.6%    Hailey will complete TUG within 12 seconds or less (indicative of higher fall risk) or decrease score by >/= 4.85sec (MDC for PD) in order to decrease fall risk and to enhance safety during ADLs/ IADLs. (Scores > or equal to 12 seconds indicates high risk for falls in older adults; 11-20 seconds is normal for the frail and elderly.) Baseline 6/30/2025: 23.19 sec without device    Hailey will improve TUG cognitive score to within 4.5 seconds of TUG score to decrease fall risk.  A > 4.5 second difference between TUG and TUG cog indicates increased risk of falls. Baseline 6/30/2025: 24.9 seconds without device    Hailey  will improve miniBEST score to at least 22/28 points (</= 21.5 predicts falls in persons with PD) in order to decrease fall risk and to improve dynamic balance and safety with functional mobility. Baseline 6/30/2025 11/28    Hailey will ambulate independently  on even surfaces for community distances without imbalance to improve ease of functional mobility, participation in ADLs and other household recreational activities, and to enhance access to the community during IADLs.     Hailey will be able to ascend/descend >/= 8 stairs  with use of handrail with most appropriate stepping pattern in order for Hailey to increase her safe access to all levels of her home and with ambulation within her home and community.     Hailey will demonstrate independence and report compliance with HEP to facilitate independent rehab program upon discharge for long-term maintenance of condition and gains from PT POC.       Time Calculation  Start Time: 1504  Stop Time: 1545  Time Calculation (min): 41 min  PT Evaluation Time Entry  PT Evaluation (Moderate) Time Entry: 26  PT Therapeutic Procedures Time Entry  Therapeutic Activity Time Entry: 15

## 2025-06-30 ENCOUNTER — EVALUATION (OUTPATIENT)
Dept: PHYSICAL THERAPY | Facility: CLINIC | Age: 85
End: 2025-06-30
Payer: MEDICARE

## 2025-06-30 ENCOUNTER — APPOINTMENT (OUTPATIENT)
Dept: OCCUPATIONAL THERAPY | Facility: CLINIC | Age: 85
End: 2025-06-30
Payer: MEDICARE

## 2025-06-30 ENCOUNTER — EVALUATION (OUTPATIENT)
Dept: OCCUPATIONAL THERAPY | Facility: CLINIC | Age: 85
End: 2025-06-30
Payer: MEDICARE

## 2025-06-30 DIAGNOSIS — F02.80 MIXED ALZHEIMER AND VASCULAR DEMENTIA: ICD-10-CM

## 2025-06-30 DIAGNOSIS — F01.50 MIXED ALZHEIMER AND VASCULAR DEMENTIA: ICD-10-CM

## 2025-06-30 DIAGNOSIS — F02.80 MIXED ALZHEIMER AND VASCULAR DEMENTIA: Primary | ICD-10-CM

## 2025-06-30 DIAGNOSIS — G30.9 MIXED ALZHEIMER AND VASCULAR DEMENTIA: Primary | ICD-10-CM

## 2025-06-30 DIAGNOSIS — F01.50 MIXED ALZHEIMER AND VASCULAR DEMENTIA: Primary | ICD-10-CM

## 2025-06-30 DIAGNOSIS — G30.9 MIXED ALZHEIMER AND VASCULAR DEMENTIA: ICD-10-CM

## 2025-06-30 PROCEDURE — 97530 THERAPEUTIC ACTIVITIES: CPT | Mod: GP

## 2025-06-30 PROCEDURE — 97165 OT EVAL LOW COMPLEX 30 MIN: CPT | Mod: GO | Performed by: OCCUPATIONAL THERAPIST

## 2025-06-30 PROCEDURE — 97162 PT EVAL MOD COMPLEX 30 MIN: CPT | Mod: GP

## 2025-06-30 ASSESSMENT — PATIENT HEALTH QUESTIONNAIRE - PHQ9
1. LITTLE INTEREST OR PLEASURE IN DOING THINGS: NOT AT ALL
2. FEELING DOWN, DEPRESSED OR HOPELESS: NOT AT ALL
SUM OF ALL RESPONSES TO PHQ9 QUESTIONS 1 AND 2: 0

## 2025-06-30 ASSESSMENT — ENCOUNTER SYMPTOMS
OCCASIONAL FEELINGS OF UNSTEADINESS: 1
DEPRESSION: 0
LOSS OF SENSATION IN FEET: 1

## 2025-06-30 NOTE — PROGRESS NOTES
Occupational Therapy Evaluation    Patient Name:  Hailey Rosas   Patient MRN: [id number]  Date: 6/30/2025  Time Calculation  Start Time: 1547  Stop Time: 1612  Time Calculation (min): 25 min    Total Timed Minutes: 0  Total Untimed Minutes: 25    OT Evaluation Time Entry  Evaluation (Low) Time Entry: 25                      ASSESSMENT:  Patient was referred to occupational therapy for an evaluation and treatment s/p vascular dementia. OT evaluation completed this date.  Patient's main functional deficits include difficulty handwriting.  OT educated patient and daughter on weighted pen and handwriting worksheets to practice.  Patient did not demonstrate significant limitations in UE AROM, strength or FMC.  Patient and daughter agree that skilled OT is not required at this time.    PLAN:      At this time, skilled OT intervention is not required.  Patient will be discharged.    Plan of care was developed with input and agreement by the patient.     Insurance:  Visit number: 1   Insurance Type: [facility] / Plan: AET MEDICARE ASSURE / Product Type: *No Product type* /   Authorization or Plan of Care date Range: PT/OT VISITS ARE MED NEC NO AUTH NEEDED   Copay:$15 COPAY PER DAY   Referred by: Nancy Muñoz,*     SUBJECTIVE:  Patient is a 85 year old who attends OT evaluation today accompanied by her daughter Cathy. she reports her memory causes her to not be able to focus on certain tasks.  She was diagnosed with vascular dementia and was sent by neurology for OT consult.  Hailey states she is able to dress independently, groom, and feed herself.  She ambulates with a cane and does not drive.  Patient's daughter performs grocery shopping and drives Hailey to medical appointments.  She does not complain of pain, but daughter states some right hand tremoring is present and they have purchased weighted silverware for patient use.      she is RHD   her chief complaint is no complaints, uncertain  "why they are here.  her goal for Occupational Therapy is to none- uncertain why they are here     she lives with her spouse in a single family home.  Daughter is driving to appointments, performing grocery shopping and assisting with medication management.     General:  Reason for visit: Vascular dementia  Referred by: Dr. 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        Medical History Form scanned into chart    Precautions:         Fall Risk: High         Denies: Pacemaker        Past Surgical history:        Past Medical history: Breast cancer, CVA, OA, DM, hand tremoring, vascular dementia        Pain Assessment:      Pain Assessment: 0-10      Pain (0-10): 0       Pain Location BUE    OBJECTIVE:   strength:  - RUE : 36#  - LUE : 30#    Pinch Strength:  - Lateral:       - RUE: 6#       - LUE: 7#  - 3 Jaw:       - RUE: 5#       - LUE: 6#    Outcome Measures:  OT Adult Other Outcome Measures:   9 Hole Peg Test: R: 1:22      L: 1:06  OT Adult Other Outcome Measures  Other Outcome Measures: Quick Dash 28  (38.64%)      Goals:  N/A skilled OT not requried at this time    Treatment Performed: (\"NP\" = Not Performed)     Treatment:  Low Complex OT Eval: 25 min    Therapeutic Exercise: NP  -   Therapeutic Activities: NP  -   Manual Therapy: NP  -   Neuromuscular Re-Education: NP  -   Modalities: NP  -     Education: Educated patient and daughter on weighted bracelet, weighted pen.  "

## 2025-07-09 ENCOUNTER — TREATMENT (OUTPATIENT)
Dept: PHYSICAL THERAPY | Facility: CLINIC | Age: 85
End: 2025-07-09
Payer: MEDICARE

## 2025-07-09 DIAGNOSIS — F01.50 MIXED ALZHEIMER AND VASCULAR DEMENTIA: ICD-10-CM

## 2025-07-09 DIAGNOSIS — F02.80 MIXED ALZHEIMER AND VASCULAR DEMENTIA: ICD-10-CM

## 2025-07-09 DIAGNOSIS — G30.9 MIXED ALZHEIMER AND VASCULAR DEMENTIA: ICD-10-CM

## 2025-07-09 PROCEDURE — 97110 THERAPEUTIC EXERCISES: CPT | Mod: GP

## 2025-07-09 PROCEDURE — 97112 NEUROMUSCULAR REEDUCATION: CPT | Mod: GP

## 2025-07-09 NOTE — PROGRESS NOTES
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 Hailey Rosas's daughter reports that over the past few days Hailey has been able to walk without holding her daughter's hand for support.     Pain:  0/10    Genia Martin, SPT, participated during session.  IKelsey, PT, DPT, NCS directly supervised during session.         Objective     Treatments:  Abbreviation Key  NP = not performed this visit   NV = next visit  // = parallel    Assigned HEP- Med[employer] [id number]     Therapeutic Exercise:  12 minutes    Seated exercises - x20 ea  Seated marches  Glute squeezes  LAQs  Heel/toe raises  Adductor squeezes with ball  Abduction with red band  Education as discussed in Education section below    Neuromuscular Re-education: 29 minutes   NBOS >>> NBOS EC >> NBOS airex  >>> NBOS airex EC  30s each  Semi-tandem stance (1x30s ea side) >> Tandem stance R (1x30s) >> Semi-tandem heel to toe (1x30s ea)  Walking with no device, 1 lap around gym  Cues to stand up straight, take big steps  Backward walking in // bars  X6 laps  Side stepping in // bars   X6 laps (3 each direction)  Standing marches in // bars  X20  Walking and stepping over low zach in // bars   Performed x4  Cues to step over the zach rather than around zach      Outpatient Education: Education on purpose of all interventions performed today. Discussed the importance of completing HEP, with emphasis on completing exercises near a stable surface at home (such as the counter). Education provided to both patient and her daughter, who will be occasionally assisting with HEP at home in addition to other family members.  Family member always present to assist with HEP per daughter.    Hailey Rosas verbalizes understanding of all education provided: Yes    Assessment:  Hailey Rosas completed treatment today which focused upon gait and balance training to continue addressing her instability while standing and walking. Throughout today's session, Hailey was able to complete all interventions appropriately when provided with moderate verbal and visual cues. During standing activities, she experienced moderate cardiovascular and muscular fatigue, especially after backward walking and at the end of her session. Took one active seated rest break today, at which time all seated therex was introduced.  She was significantly challenged with exercises such as tandem stance and backward walking today. During tandem stance she showed moderate instability, requiring her to grab onto the // bars for support. Additionally, during backward walking, she required frequent cues to take big steps, most notably with her left leg.  Hailey's  response to tx was moderate cardiovascular and muscular fatigue (especially at end of session), required  frequent cues to complete exercises appropriately.  Hailey's Progress towards goals: improvement with dynamic balance and stability during gait. Hailey continues to be at an elevated fall risk based upon these gait and balance deficits. Hailey Rosas continues to have gait deficits, balance deficits, and decreased strength limiting participation in ADLs, IADLs, and meaningful activities. Further skilled therapy services are warranted to address the remaining impairments in order to progress towards functional goals. Hailey left session with all questions answered and no increase in symptoms.      Plan:  Monitor response/compliance with HEP. Continue with global LE strengthening as well as dynamic balance and gait training.      Time Calculation  Start Time: 1639  Stop Time: 1720  Time Calculation (min): 41 min     PT Therapeutic Procedures Time Entry  Therapeutic Exercise Time Entry: 12  Neuromuscular Re-Education Time Entry: 29

## 2025-07-09 NOTE — PROGRESS NOTES
"Physical Therapy    Physical Therapy Treatment    Patient Name: Hailey Rosas  MRN: [id number]  Today's Date: 7/9/2025     Insurance - reviewed   Visit number: 2 (updated 07/09/25)   Payor: [facility] / Plan: [facility] MEDICARE ASSURE / Product Type: *No Product type* /    $15 COPAY VISITS ARE MED NEC NO AUTH NEEDED PAYS % OOP 3000.00 60.00 APPLIED   Referring Provider: Nancy Muñoz,*       Current Problem  Problem List Items Addressed This Visit    None      Precautions:  Fall Risk: High   Denies: Pacemaker, Hypertension, latex allergy, epilepsy/seizures, other known cardiac problems, other known neurologic problem, other known pulmonary problems, unexpected weight gain or loss, saddle anesthesia, night sweats, night pain, diplopia, and drop attacks  Past Surgical history: BL Knee Replacement, Lumpectomy (L); Early 90s  Past Medical history: Cancer and Diabetes; Breast CA, Mini Strokes    General  Subjective    ***Start on balance HEP; dynamic balance exercises     Hailey SMITH Ralph {taldenies:41842::\"denies\"} any falls or complications since last session. Hailey Rosas reports ***    Hailey Rosas reports {talgfp:53030::\"good\"} compliance with HEP.    Pain:  ***/10  Pain location: ***      Objective     Treatments:  Abbreviation Key  NP = not performed this visit   NV = next visit  // = parallel    Assigned HEP- Med[facility] ***    Therapeutic Exercise:    minutes        Neuromuscular Re-education:   minutes         Outpatient Education: {Education:83043}   Hailey Rosas verbalizes understanding of all education provided: {yes,no:36403::\"Yes\"}    Assessment:  Hailey Rosas completed treatment today which focused upon gait and balance training in order to address gait and balance deficits.  Hailey presents with ***.  Hailey requires ***  Hailey was challenged with  ***.  Hailey's  response to tx was ***.  Hailey's Progress towards goals: ***. Hailey continues to be at an elevated fall risk based upon these " gait and balance deficits. Hailey Rosas continues to have {taldeficits:41205} limiting participation in ADLs, IADLs, and meaningful activities. Further skilled therapy services are warranted to address the remaining impairments in order to progress towards functional goals. Hailey left session with all questions answered and no increase in symptoms.      Plan:  {.Daily note plan.:21718}

## 2025-07-10 ENCOUNTER — APPOINTMENT (OUTPATIENT)
Dept: PHYSICAL THERAPY | Facility: CLINIC | Age: 85
End: 2025-07-10
Payer: MEDICARE

## 2025-07-15 ENCOUNTER — TREATMENT (OUTPATIENT)
Dept: PHYSICAL THERAPY | Facility: CLINIC | Age: 85
End: 2025-07-15
Payer: MEDICARE

## 2025-07-15 DIAGNOSIS — F02.80 MIXED ALZHEIMER AND VASCULAR DEMENTIA: ICD-10-CM

## 2025-07-15 DIAGNOSIS — F01.50 MIXED ALZHEIMER AND VASCULAR DEMENTIA: ICD-10-CM

## 2025-07-15 DIAGNOSIS — G30.9 MIXED ALZHEIMER AND VASCULAR DEMENTIA: ICD-10-CM

## 2025-07-15 PROCEDURE — 97110 THERAPEUTIC EXERCISES: CPT | Mod: GP

## 2025-07-15 PROCEDURE — 97112 NEUROMUSCULAR REEDUCATION: CPT | Mod: GP

## 2025-07-15 NOTE — PROGRESS NOTES
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 Hailey Rosas's dtr reports she has been completing HEP. Has started a new medication last week (memantine). Patient is stating it is too bright     Hailey Rosas reports good compliance with HEP.    Pain:  0/10    Objective     Treatments:  Abbreviation Key  NP = not performed this visit   NV = next visit  // = parallel    Assigned HEP- Med[facility] [id number]     Therapeutic Exercise:  15 minutes    Seated exercises - x20 ea  Seated marches  Glute squeezes  LAQs  Heel/toe raises  Adductor squeezes with ball  Abduction with red band    Neuromuscular Re-education: 26 minutes - increased time   Step Overs Low zach x10 BL LE; R is more difficult than   Walking and stepping over low zach in // bars         Performed x4  Cues to step over the zach rather than around  zach    Outpatient Education: Reviewed home exercise program. Provided manual cues for correct performance of exercises. Provided verbal feedback to improve exercise technique.   Hailey Rosas verbalizes understanding of all education provided: Yes    Assessment:  Hailey Rosas completed treatment today which focused upon strength and balance training in order to address decreased stability with walking.  Hailey presents with decreased step clearance when completing low hurdles which causes a decrease in balance.  Hailey requires increased repetition with verbal/tactile and visual cues throughout the session.  Hailey was challenged with  low zach gait and step overs with 1 single zach - specifically R more so than the L.  Hailey's  response to tx was: Patient tolerated therapeutic interventions well and without any adverse events. Hailey's Progress towards goals: Patient reports there has not been a significant change in functional abilities. Hailey Rosas continues to have gait deficits and balance deficits limiting functional mobility and performance. Further skilled therapy services are warranted to address the remaining impairments in order to progress towards functional goals. Hailey left session with all questions answered and no increase in symptoms.      Plan:  Continue with step overs  Work on quality of gait   Repeated STS       Time Calculation  Start Time: 1345  Stop Time: 1426  Time Calculation (min): 41 min     PT Therapeutic Procedures Time Entry  Therapeutic Exercise Time Entry: 15  Neuromuscular Re-Education Time Entry: 26

## 2025-07-22 ENCOUNTER — TREATMENT (OUTPATIENT)
Dept: PHYSICAL THERAPY | Facility: CLINIC | Age: 85
End: 2025-07-22
Payer: MEDICARE

## 2025-07-22 DIAGNOSIS — G30.9 MIXED ALZHEIMER AND VASCULAR DEMENTIA: ICD-10-CM

## 2025-07-22 DIAGNOSIS — F02.80 MIXED ALZHEIMER AND VASCULAR DEMENTIA: ICD-10-CM

## 2025-07-22 DIAGNOSIS — F01.50 MIXED ALZHEIMER AND VASCULAR DEMENTIA: ICD-10-CM

## 2025-07-22 PROCEDURE — 97112 NEUROMUSCULAR REEDUCATION: CPT | Mod: GP

## 2025-07-22 PROCEDURE — 97110 THERAPEUTIC EXERCISES: CPT | Mod: GP

## 2025-07-22 PROCEDURE — 97530 THERAPEUTIC ACTIVITIES: CPT | Mod: GP

## 2025-07-22 NOTE — PROGRESS NOTES
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 Her daughter - denies any falls or complications since last session.  Hailey Rosas's dtr has no other news to report on Hailey's status.     Hailey Rosas reports good compliance with HEP.     Pain: reports minimal knee pain but not able to put a specific number    Objective     Treatments:  Abbreviation Key  NP = not performed this visit   NV = next visit  // = parallel    Assigned HEP- Med[facility] [id number]     Therapeutic Exercise:  21 minutes    Seated LAQ 2# AW x10 BL   Seated Marches 2# AW x10   Heel Raises/Toe Raises x15 with 2# AW   ABD Ball Squeeze in sitting x10 with verbal cueing     Therapeutic Activity: 15 minutes  Repeated sit to/from stands x10; seated rest break x3; seated rest break x4   No UE hand use to  assist - patient is able to complete with just LE strength   Working on functional endurance - ambulates in clinic for 300ft - with no device   Re-educate patient's dtr on HEP program and how to appropriately progress the patient     Neuromuscular Re-education: 9 minutes - no device, gait belt   Hurdles (low) 1 min on and 1 min off; with blaze pod tapping ~ x50ft; 2 trials   1 min static stand break with normal KARMA  - CGA   5min of walking over low hurdles and tapping blaze pods ~ x250ft     Outpatient Education: Reviewed home exercise program. Answered questions about home exercise program. Provided manual cues for correct performance of exercises. Provided verbal feedback to improve exercise technique. Discussed significance of applying ice to reduce pain.   Hailey Rosas verbalizes understanding of all education provided: Yes    Assessment:  Hailey Rosas completed treatment today which focused upon improving dynamic balance with no device and LE strength in order to address increased fall risk and decreased balance.  Hailey presents with increased difficulty completing step overs and many times wants to ambulate around the hurdles. Hailey requires high level of verbal and tactile cueing throughout the entire session in order to complete the exercises appropriately.  Hailey was challenged with  zach step overs and blaze pod tapping during this session. She continues increased time to complete tasks.  Hailey's  response to tx was: Patient tolerated therapeutic interventions well and without any adverse events. Hailey's Progress towards goals: Patient reports there has not been a significant change in functional abilities. Hailey Rosas continues to have gait deficits, balance deficits, decreased strength, and pain limiting participation in household chores, recreational activities, attending medical appointments, and shopping within the community. Further skilled therapy services are warranted to address the  remaining impairments in order to progress towards functional goals. Hailey left session with all questions answered and no increase in symptoms.      Plan:  Continue with gait with no device   Re-try step overs on low zach   Continue with proximal hip strengthening   Dynamic Stepping Exercise     Time Calculation  Start Time: 1302  Stop Time: 1347  Time Calculation (min): 45 min     PT Therapeutic Procedures Time Entry  Therapeutic Exercise Time Entry: 21  Neuromuscular Re-Education Time Entry: 9  Therapeutic Activity Time Entry: 15

## 2025-08-05 ENCOUNTER — TREATMENT (OUTPATIENT)
Dept: PHYSICAL THERAPY | Facility: CLINIC | Age: 85
End: 2025-08-05
Payer: MEDICARE

## 2025-08-05 DIAGNOSIS — G30.9 MIXED ALZHEIMER AND VASCULAR DEMENTIA: ICD-10-CM

## 2025-08-05 DIAGNOSIS — F02.80 MIXED ALZHEIMER AND VASCULAR DEMENTIA: ICD-10-CM

## 2025-08-05 DIAGNOSIS — F01.50 MIXED ALZHEIMER AND VASCULAR DEMENTIA: ICD-10-CM

## 2025-08-05 PROCEDURE — 97110 THERAPEUTIC EXERCISES: CPT | Mod: GP

## 2025-08-05 PROCEDURE — 97112 NEUROMUSCULAR REEDUCATION: CPT | Mod: GP

## 2025-08-05 NOTE — PROGRESS NOTES
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 Her daughter denies any falls or complications since last session. Hailey Rosas's daughter reports that mother is a little slow today.    Hailey Rosas reports compliance with HEP.    Pain:  0/10  Pain location: none reported at baseline, daughter reports intermittent B knee pain at baseline with hx of B TKR    Objective     Treatments:  Abbreviation Key  NP = not performed this visit   NV = next visit  // = parallel    Assigned HEP- Med[facility] [id number]   Exercises  - Standing March with Counter Support  - 2 x daily - 7 x weekly - 1-2 sets - 10 reps  - Seated Heel Toe Raises  - 3 x daily - 7 x weekly - 1 sets - 20 reps  - Seated Long Arc Quad  - 1-2 x daily - 7 x weekly - 2 sets - 20 reps - 5 second hold  - Seated March  - 1-2 x daily - 7 x weekly - 2 sets - 20  "reps  - Seated Gluteal Sets  - 1-2 x daily - 7 x weekly - 2 sets - 20 reps - 5 seconds hold  - Seated Hip Adduction Squeeze with Ball  - 1-2 x daily - 7 x weekly - 2 sets - 20 reps - 5 seconds hold  - Seated Hip Abduction with Resistance  - 2 x daily - 7 x weekly - 2 sets - 20 reps - 3 seconds hold  - Seated Scapular Retraction  - 2 x daily - 7 x weekly - 1-2 sets - 10 reps  - Standing Gastroc Stretch  - 2 x daily - 7 x weekly - 5-10 reps - 10-20 sec hold  - Seated Hamstring Stretch with Chair  - 2 x daily - 7 x weekly - 5-10 reps - 10-20 sec hold    Therapeutic Exercise:  20 minutes    NuStep, seat 8-6, attempted to keep SPM > 80, unable despite help/cues  Mild B knee aches during/following  X 4 minutes total with pacing/cues for encouragement,rationale provided  Standing gastroc stretch- in // bars then against wall - each leg  Seated HS stretch - each leg  Seated scap retraction    Neuromuscular Re-education: 19 minutes              no device, in // bars, gait belt donned and at least CGA x1  Stepping over hurdles in series- FWD   Step in/over overturned 4\" step    Big steps in // bars    Outpatient Education: Reviewed and updated home exercise program. Answered questions about home exercise program. Provided manual/verbal cues for correct performance of exercises. Much time spent redirecting, answering questions about rationale due to cognitive deficits.   Hailey Rosas verbalizes understanding of all education provided: Yes    Assessment:  Hailey Rosas completed treatment today which focused upon gait and balance training in order to address gait and balance deficits.  Hailey presents with forward head posture, slow all and decreased swing and knee extension during stance.  Updated HEP with ex to address these deficits.  Hailey requires frequent cues to stay on task and increased time required to perform all ex.   Hailey was challenged with swing phase and stepping over objects. Post session, observed Hailey " go down 1 curb step with max difficulty, HHA x1 by daughter.  Plan to address this next session. Hailey's  response to tx was anticipated fatigue.  Hailey continues to be at an elevated fall risk based upon these gait and balance deficits. Further skilled therapy services are warranted to address the remaining impairments in order to progress towards functional goals. Hailey left session with all questions answered and no increase in symptoms.      Plan:  Curbs next session!  Continue with gait with no device   Re-try step overs on low zach   Continue with proximal hip strengthening   Dynamic Stepping Exercise     Time Calculation  Start Time: 1232  Stop Time: 1315  Time Calculation (min): 43 min     PT Therapeutic Procedures Time Entry  Therapeutic Exercise Time Entry: 20  Neuromuscular Re-Education Time Entry: 19

## 2025-08-06 DIAGNOSIS — E11.69 TYPE 2 DIABETES MELLITUS WITH OTHER SPECIFIED COMPLICATION, UNSPECIFIED WHETHER LONG TERM INSULIN USE (MULTI): ICD-10-CM

## 2025-08-06 RX ORDER — METFORMIN HYDROCHLORIDE 500 MG/1
500 TABLET ORAL
Qty: 180 TABLET | Refills: 2 | Status: SHIPPED | OUTPATIENT
Start: 2025-08-06

## 2025-08-12 ENCOUNTER — DOCUMENTATION (OUTPATIENT)
Dept: PHYSICAL THERAPY | Facility: CLINIC | Age: 85
End: 2025-08-12
Payer: MEDICARE

## 2025-08-14 ENCOUNTER — TREATMENT (OUTPATIENT)
Dept: PHYSICAL THERAPY | Facility: CLINIC | Age: 85
End: 2025-08-14
Payer: MEDICARE

## 2025-08-14 DIAGNOSIS — F02.80 MIXED ALZHEIMER AND VASCULAR DEMENTIA: ICD-10-CM

## 2025-08-14 DIAGNOSIS — F01.50 MIXED ALZHEIMER AND VASCULAR DEMENTIA: ICD-10-CM

## 2025-08-14 DIAGNOSIS — G30.9 MIXED ALZHEIMER AND VASCULAR DEMENTIA: ICD-10-CM

## 2025-08-14 PROCEDURE — 97112 NEUROMUSCULAR REEDUCATION: CPT | Mod: GP

## 2025-08-14 PROCEDURE — 97110 THERAPEUTIC EXERCISES: CPT | Mod: GP

## 2025-08-25 ENCOUNTER — TREATMENT (OUTPATIENT)
Dept: PHYSICAL THERAPY | Facility: CLINIC | Age: 85
End: 2025-08-25
Payer: MEDICARE

## 2025-08-25 DIAGNOSIS — G30.9 MIXED ALZHEIMER AND VASCULAR DEMENTIA: ICD-10-CM

## 2025-08-25 DIAGNOSIS — F01.50 MIXED ALZHEIMER AND VASCULAR DEMENTIA: ICD-10-CM

## 2025-08-25 DIAGNOSIS — F02.80 MIXED ALZHEIMER AND VASCULAR DEMENTIA: ICD-10-CM

## 2025-08-25 PROCEDURE — 97530 THERAPEUTIC ACTIVITIES: CPT | Mod: GP

## 2026-04-09 ENCOUNTER — APPOINTMENT (OUTPATIENT)
Dept: PRIMARY CARE | Facility: CLINIC | Age: 86
End: 2026-04-09
Payer: MEDICARE